# Patient Record
Sex: FEMALE | URBAN - METROPOLITAN AREA
[De-identification: names, ages, dates, MRNs, and addresses within clinical notes are randomized per-mention and may not be internally consistent; named-entity substitution may affect disease eponyms.]

---

## 2023-01-23 ENCOUNTER — INITIAL CONSULT (OUTPATIENT)
Dept: PAIN MANAGEMENT | Age: 68
End: 2023-01-23
Payer: MEDICAID

## 2023-01-23 VITALS
WEIGHT: 198 LBS | BODY MASS INDEX: 35.08 KG/M2 | HEIGHT: 63 IN | SYSTOLIC BLOOD PRESSURE: 138 MMHG | TEMPERATURE: 97.6 F | DIASTOLIC BLOOD PRESSURE: 88 MMHG

## 2023-01-23 DIAGNOSIS — M16.11 OSTEOARTHRITIS OF RIGHT HIP, UNSPECIFIED OSTEOARTHRITIS TYPE: ICD-10-CM

## 2023-01-23 DIAGNOSIS — M16.12 OSTEOARTHRITIS OF LEFT HIP, UNSPECIFIED OSTEOARTHRITIS TYPE: Primary | ICD-10-CM

## 2023-01-23 PROCEDURE — G8417 CALC BMI ABV UP PARAM F/U: HCPCS | Performed by: PAIN MEDICINE

## 2023-01-23 PROCEDURE — 1036F TOBACCO NON-USER: CPT | Performed by: PAIN MEDICINE

## 2023-01-23 PROCEDURE — G8484 FLU IMMUNIZE NO ADMIN: HCPCS | Performed by: PAIN MEDICINE

## 2023-01-23 PROCEDURE — 1123F ACP DISCUSS/DSCN MKR DOCD: CPT | Performed by: PAIN MEDICINE

## 2023-01-23 PROCEDURE — 3017F COLORECTAL CA SCREEN DOC REV: CPT | Performed by: PAIN MEDICINE

## 2023-01-23 PROCEDURE — G8400 PT W/DXA NO RESULTS DOC: HCPCS | Performed by: PAIN MEDICINE

## 2023-01-23 PROCEDURE — 1090F PRES/ABSN URINE INCON ASSESS: CPT | Performed by: PAIN MEDICINE

## 2023-01-23 PROCEDURE — G8427 DOCREV CUR MEDS BY ELIG CLIN: HCPCS | Performed by: PAIN MEDICINE

## 2023-01-23 PROCEDURE — 99203 OFFICE O/P NEW LOW 30 MIN: CPT | Performed by: PAIN MEDICINE

## 2023-01-23 RX ORDER — AMLODIPINE BESYLATE 10 MG/1
TABLET ORAL
COMMUNITY
Start: 2023-01-04

## 2023-01-23 RX ORDER — TRAMADOL HYDROCHLORIDE 50 MG/1
50 TABLET ORAL EVERY 8 HOURS PRN
Qty: 21 TABLET | Refills: 0 | Status: SHIPPED | OUTPATIENT
Start: 2023-01-23 | End: 2023-01-30

## 2023-01-23 RX ORDER — ATORVASTATIN CALCIUM 20 MG/1
TABLET, FILM COATED ORAL
COMMUNITY
Start: 2022-12-07

## 2023-01-23 ASSESSMENT — ENCOUNTER SYMPTOMS
ALLERGIC/IMMUNOLOGIC NEGATIVE: 1
EYES NEGATIVE: 1
GASTROINTESTINAL NEGATIVE: 1
RESPIRATORY NEGATIVE: 1

## 2023-01-23 NOTE — PROGRESS NOTES
History of Present Illness     Patient Identification  Gene Anderson is a 78 y.o. female. Patient information was obtained from patient. Chief Complaint   Chief Complaint   Patient presents with    Hip Pain     bilateral       Patient presents with complaint of anterior thigh pain past 2 years. This is a result of no known injury. Has been gradually worsening since. described as aching and sharp and rated as severe and 8 / 10, without radiation. Symptoms include no other symptoms. The patient denies weakness, numbness, incontinence. The patient denies other injuries. Care prior to arrival consisted of NSAID and acetaminophen with moderate relief. History reviewed. No pertinent past medical history. History reviewed. No pertinent family history. Current Outpatient Medications   Medication Sig Dispense Refill    amLODIPine (NORVASC) 10 MG tablet take 1 tablet by mouth once daily      atorvastatin (LIPITOR) 20 MG tablet take 1 tablet by mouth once daily       No current facility-administered medications for this visit. No Known Allergies    Review of Systems  Review of Systems   Constitutional: Negative. HENT: Negative. Eyes: Negative. Respiratory: Negative. Cardiovascular: Negative. Gastrointestinal: Negative. Endocrine: Negative. Genitourinary: Negative. Musculoskeletal: Negative. Skin: Negative. Allergic/Immunologic: Negative. Neurological: Negative. 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 Hematological: Negative. Psychiatric/Behavioral: Negative. All other systems reviewed and are negative. Physical Exam     /88 (Site: Left Upper Arm, Position: Sitting, Cuff Size: Large Adult)   Temp 97.6 °F (36.4 °C) (Temporal)   Ht 5' 3\" (1.6 m)   Wt 198 lb (89.8 kg)   BMI 35.07 kg/m²   Physical Exam  Vitals and nursing note reviewed. Constitutional:       Appearance: Normal appearance. HENT:      Head: Normocephalic.       Right Ear: Ear canal normal.      Left Ear: Ear canal normal.      Nose: Nose normal.      Mouth/Throat:      Mouth: Mucous membranes are moist.   Eyes:      Extraocular Movements: Extraocular movements intact. Conjunctiva/sclera: Conjunctivae normal.      Pupils: Pupils are equal, round, and reactive to light. Cardiovascular:      Rate and Rhythm: Normal rate and regular rhythm. Pulses: Normal pulses. Heart sounds: Normal heart sounds. Pulmonary:      Effort: Pulmonary effort is normal.      Breath sounds: Normal breath sounds. Abdominal:      General: Abdomen is flat. Bowel sounds are normal.      Palpations: Abdomen is soft. Musculoskeletal:         General: Normal range of motion. Cervical back: Normal range of motion and neck supple. Comments: Severe antalgic Gait, Able to walk on Toes and heels, No facet or SI Tenderness, Severe pain on Hip grinding. Skin:     General: Skin is warm. Capillary Refill: Capillary refill takes less than 2 seconds. Neurological:      General: No focal deficit present. Mental Status: She is alert. Mental status is at baseline. She is disoriented. Psychiatric:         Mood and Affect: Mood normal.         Behavior: Behavior normal.         Thought Content: Thought content normal.         Judgment: Judgment normal.         Plan     Studies: Xrays Done in  Severe left moderate Rt sided OA. Will start small dose Tramadol, Advised THAs both sides.

## 2023-01-30 ENCOUNTER — OFFICE VISIT (OUTPATIENT)
Dept: PAIN MANAGEMENT | Age: 68
End: 2023-01-30
Payer: MEDICAID

## 2023-01-30 VITALS
HEIGHT: 63 IN | BODY MASS INDEX: 35.08 KG/M2 | TEMPERATURE: 97.9 F | DIASTOLIC BLOOD PRESSURE: 78 MMHG | SYSTOLIC BLOOD PRESSURE: 124 MMHG | WEIGHT: 198 LBS

## 2023-01-30 DIAGNOSIS — M16.11 OSTEOARTHRITIS OF RIGHT HIP, UNSPECIFIED OSTEOARTHRITIS TYPE: ICD-10-CM

## 2023-01-30 DIAGNOSIS — M16.12 OSTEOARTHRITIS OF LEFT HIP, UNSPECIFIED OSTEOARTHRITIS TYPE: ICD-10-CM

## 2023-01-30 LAB
6-ACETYLMORPHINE, UR: NORMAL
AMPHETAMINE SCREEN, URINE: NORMAL
BARBITURATE SCREEN, URINE: NORMAL
BENZODIAZEPINE SCREEN, URINE: NORMAL
CANNABINOID SCREEN URINE: NORMAL
COCAINE METABOLITE, URINE: NORMAL
CREATININE URINE: NORMAL
EDDP, URINE: NORMAL
ETHANOL URINE: NORMAL
MDMA URINE: NORMAL
METHADONE SCREEN, URINE: NORMAL
METHAMPHETAMINE, URINE: NORMAL
OPIATES, URINE: NORMAL
OXYCODONE: NORMAL
PCP: NORMAL
PH, URINE: NORMAL
PHENCYCLIDINE, URINE: NORMAL
PROPOXYPHENE, URINE: NORMAL
TRICYCLIC ANTIDEPRESSANTS, UR: NORMAL

## 2023-01-30 PROCEDURE — 1090F PRES/ABSN URINE INCON ASSESS: CPT | Performed by: PAIN MEDICINE

## 2023-01-30 PROCEDURE — 3017F COLORECTAL CA SCREEN DOC REV: CPT | Performed by: PAIN MEDICINE

## 2023-01-30 PROCEDURE — 99213 OFFICE O/P EST LOW 20 MIN: CPT | Performed by: PAIN MEDICINE

## 2023-01-30 PROCEDURE — 1123F ACP DISCUSS/DSCN MKR DOCD: CPT | Performed by: PAIN MEDICINE

## 2023-01-30 PROCEDURE — G8417 CALC BMI ABV UP PARAM F/U: HCPCS | Performed by: PAIN MEDICINE

## 2023-01-30 PROCEDURE — 1036F TOBACCO NON-USER: CPT | Performed by: PAIN MEDICINE

## 2023-01-30 PROCEDURE — G8427 DOCREV CUR MEDS BY ELIG CLIN: HCPCS | Performed by: PAIN MEDICINE

## 2023-01-30 PROCEDURE — G8400 PT W/DXA NO RESULTS DOC: HCPCS | Performed by: PAIN MEDICINE

## 2023-01-30 PROCEDURE — G8484 FLU IMMUNIZE NO ADMIN: HCPCS | Performed by: PAIN MEDICINE

## 2023-01-30 RX ORDER — TRAMADOL HYDROCHLORIDE 50 MG/1
50 TABLET ORAL EVERY 8 HOURS PRN
Qty: 84 TABLET | Refills: 0 | Status: SHIPPED | OUTPATIENT
Start: 2023-01-30 | End: 2023-02-27

## 2023-01-30 ASSESSMENT — ENCOUNTER SYMPTOMS
RESPIRATORY NEGATIVE: 1
GASTROINTESTINAL NEGATIVE: 1
ALLERGIC/IMMUNOLOGIC NEGATIVE: 1
EYES NEGATIVE: 1

## 2023-01-30 NOTE — PROGRESS NOTES
History of Present Illness     Patient Identification  Dulce Muñiz is a 78 y.o. female. Patient information was obtained from patient. Chief Complaint   Chief Complaint   Patient presents with    Hip Pain     Bilateral hip pain       Patient presents with complaint of anterior thigh pain past 2 years. This is a result of no known injury. Has been gradually worsening since. described as aching and sharp and rated as severe and 8 / 10, without radiation. Symptoms include no other symptoms. The patient denies weakness, numbness, incontinence. The patient denies other injuries. Care prior to arrival consisted of NSAID and acetaminophen with moderate relief. Started Tramadol small dose good relief no side effects, no adverse events able to do her ADLs. History reviewed. No pertinent past medical history. History reviewed. No pertinent family history. Current Outpatient Medications   Medication Sig Dispense Refill    amLODIPine (NORVASC) 10 MG tablet take 1 tablet by mouth once daily      atorvastatin (LIPITOR) 20 MG tablet take 1 tablet by mouth once daily      traMADol (ULTRAM) 50 MG tablet Take 1 tablet by mouth every 8 hours as needed for Pain for up to 7 days. Max Daily Amount: 150 mg 21 tablet 0     No current facility-administered medications for this visit. No Known Allergies    Review of Systems  Review of Systems   Constitutional: Negative. HENT: Negative. Eyes: Negative. Respiratory: Negative. Cardiovascular: Negative. Gastrointestinal: Negative. Endocrine: Negative. Genitourinary: Negative. Musculoskeletal: Negative. Skin: Negative. Allergic/Immunologic: Negative. Neurological: Negative. 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 Hematological: Negative. Psychiatric/Behavioral: Negative. All other systems reviewed and are negative.      Physical Exam     /78   Temp 97.9 °F (36.6 °C)   Ht 5' 3\" (1.6 m)   Wt 198 lb (89.8 kg)   BMI 35.07 kg/m²   Physical Exam  Vitals and nursing note reviewed. Constitutional:       Appearance: Normal appearance. HENT:      Head: Normocephalic. Right Ear: Ear canal normal.      Left Ear: Ear canal normal.      Nose: Nose normal.      Mouth/Throat:      Mouth: Mucous membranes are moist.   Eyes:      Extraocular Movements: Extraocular movements intact. Conjunctiva/sclera: Conjunctivae normal.      Pupils: Pupils are equal, round, and reactive to light. Cardiovascular:      Rate and Rhythm: Normal rate and regular rhythm. Pulses: Normal pulses. Heart sounds: Normal heart sounds. Pulmonary:      Effort: Pulmonary effort is normal.      Breath sounds: Normal breath sounds. Abdominal:      General: Abdomen is flat. Bowel sounds are normal.      Palpations: Abdomen is soft. Musculoskeletal:         General: Normal range of motion. Cervical back: Normal range of motion and neck supple. Comments: Severe antalgic Gait, Able to walk on Toes and heels, No facet or SI Tenderness, Severe pain on Hip grinding. Skin:     General: Skin is warm. Capillary Refill: Capillary refill takes less than 2 seconds. Neurological:      General: No focal deficit present. Mental Status: She is alert and oriented to person, place, and time. Mental status is at baseline. Psychiatric:         Mood and Affect: Mood normal.         Behavior: Behavior normal.         Thought Content: Thought content normal.         Judgment: Judgment normal.         Plan     Studies: Xrays Done in  Severe left moderate Rt sided OA. Will continue small dose Tramadol, Advised THAs both sides.

## 2023-02-01 PROBLEM — I10 HYPERTENSION: Status: ACTIVE | Noted: 2023-02-01

## 2023-02-01 PROBLEM — E66.9 OBESITY (BMI 30.0-34.9): Status: ACTIVE | Noted: 2023-02-01

## 2023-02-01 PROBLEM — E78.00 ELEVATED SERUM CHOLESTEROL: Status: ACTIVE | Noted: 2023-02-01

## 2023-02-01 PROBLEM — M25.551 HIP PAIN, BILATERAL: Status: ACTIVE | Noted: 2023-02-01

## 2023-02-01 PROBLEM — M25.552 HIP PAIN, BILATERAL: Status: ACTIVE | Noted: 2023-02-01

## 2023-02-01 PROBLEM — E66.811 OBESITY (BMI 30.0-34.9): Status: ACTIVE | Noted: 2023-02-01

## 2023-02-01 PROBLEM — M25.50 ARTHRALGIA: Status: ACTIVE | Noted: 2023-02-01

## 2023-02-01 RX ORDER — LIDOCAINE 560 MG/1
PATCH PERCUTANEOUS; TOPICAL; TRANSDERMAL
COMMUNITY
Start: 2022-04-20

## 2023-02-01 RX ORDER — VIT C/E/ZN/COPPR/LUTEIN/ZEAXAN 250MG-90MG
CAPSULE ORAL
COMMUNITY
End: 2023-07-19 | Stop reason: DRUGHIGH

## 2023-02-01 RX ORDER — AMLODIPINE BESYLATE 10 MG/1
1 TABLET ORAL DAILY
COMMUNITY
Start: 2021-12-08 | End: 2023-07-19 | Stop reason: SDUPTHER

## 2023-02-01 RX ORDER — ATORVASTATIN CALCIUM 20 MG/1
1 TABLET, FILM COATED ORAL DAILY
COMMUNITY
Start: 2022-04-13

## 2023-02-13 ENCOUNTER — OFFICE VISIT (OUTPATIENT)
Dept: ORTHOPEDIC SURGERY | Age: 68
End: 2023-02-13
Payer: MEDICAID

## 2023-02-13 ENCOUNTER — HOSPITAL ENCOUNTER (OUTPATIENT)
Dept: ORTHOPEDIC SURGERY | Age: 68
Discharge: HOME | End: 2023-02-15
Payer: MEDICAID

## 2023-02-13 VITALS
OXYGEN SATURATION: 97 % | HEIGHT: 63 IN | BODY MASS INDEX: 34.2 KG/M2 | TEMPERATURE: 96.9 F | WEIGHT: 193 LBS | HEART RATE: 104 BPM

## 2023-02-13 DIAGNOSIS — M16.12 PRIMARY OSTEOARTHRITIS OF LEFT HIP: ICD-10-CM

## 2023-02-13 DIAGNOSIS — M16.12 PRIMARY OSTEOARTHRITIS OF LEFT HIP: Primary | ICD-10-CM

## 2023-02-13 DIAGNOSIS — M16.11 PRIMARY OSTEOARTHRITIS OF RIGHT HIP: ICD-10-CM

## 2023-02-13 PROCEDURE — 73502 X-RAY EXAM HIP UNI 2-3 VIEWS: CPT | Performed by: ORTHOPAEDIC SURGERY

## 2023-02-13 PROCEDURE — 99204 OFFICE O/P NEW MOD 45 MIN: CPT | Performed by: ORTHOPAEDIC SURGERY

## 2023-02-13 PROCEDURE — 73502 X-RAY EXAM HIP UNI 2-3 VIEWS: CPT

## 2023-02-13 PROCEDURE — 1123F ACP DISCUSS/DSCN MKR DOCD: CPT | Performed by: ORTHOPAEDIC SURGERY

## 2023-02-13 RX ORDER — CELECOXIB 200 MG/1
CAPSULE ORAL
Qty: 15 CAPSULE | Refills: 2 | Status: SHIPPED | OUTPATIENT
Start: 2023-02-13 | End: 2023-02-20

## 2023-02-13 RX ORDER — DIVALPROEX SODIUM 250 MG/1
TABLET, DELAYED RELEASE ORAL
COMMUNITY
Start: 2004-08-23

## 2023-02-13 RX ORDER — ESCITALOPRAM OXALATE 10 MG/1
TABLET ORAL
COMMUNITY
Start: 2004-08-23

## 2023-02-13 NOTE — PROGRESS NOTES
Subjective:      Patient ID: Davis Gabriel is a 78 y.o. female who presents today for:  Chief Complaint   Patient presents with    Hip Pain     Pt states pain in both hips. Pain started 3 years, pain started on left side of hip. A lot wrse in the last year. Walk w/ cane to accommodate. No injury. New patient, referred by PCP       HPI  The patient is a 52-year-old female comes in with left greater than right hip pain. She states that this has been severe over the last 2 years but has been ongoing for roughly 4 years. She has seen pain management for this. No past medical history on file. No past surgical history on file. Tobacco Use      Smoking status: Never      Smokeless tobacco: Never     has no history on file for drug use. No Known Allergies      Objective:   Pulse (!) 104   Temp 96.9 °F (36.1 °C) (Temporal)   Ht 5' 3\" (1.6 m)   Wt 193 lb (87.5 kg)   SpO2 97%   BMI 34.19 kg/m²     Physical Exam :  Pain with internal/external rotation of both hips at the groin. Limited range of motion of both hips. Radiographs and Laboratory Studies:     Diagnostic Imaging Studies:    XR HIP LEFT (2-3 VIEWS)  X-rays of her left hip were taken today. She has severe bilateral hip   osteoarthritic degenerative joint changes left worse than right. Assessment:       Diagnosis Orders   1. Primary osteoarthritis of left hip  XR HIP LEFT (2-3 VIEWS)      2. Primary osteoarthritis of right hip             Plan:      I am scheduling her for bilateral intra-articular hip injections with cortisone and lidocaine. She is a candidate for total hip arthroplasty when she is ready. I made sure to tell her that I will not do any surgery nor do I recommend her to have any type of hip surgery for at least 3 months from the time of her injection.        Surgery Phone: Vishal Samano   Surgery Fax: [phone]    Phone: [phone]          Fax: [phone]    Orthopedics: Surgery Scheduling, PAT & PRE-OP Order Form  Call to advance [place] at [phone] at least 24 hours prior to date of service     Surgery Location: [facility]: [address] [place] [address] Surgery Date: To be determined time: To be determined  Patient's Name: Edmar Lopez : 1955    Gender: female   Home Phone:  [phone] Cell Phone: [phone]    #:  [id number]  Emergency Contact:  Des   Phone: [phone]  Payor: Ariella Soto /  /  /    ID No.: [id number]      PROVIDER TO COMPLETE:  Diagnosis: Bilateral hip osteoarthritis  Procedure: Bilateral hip injections with cortisone and lidocaine  ICD-10 Code: _______________________  CPT Codes: ________________________  Neuromonitoring: []   Navigation: []   HCI table: [x]   HCI Table with With [employer]: []   Mayer Table []   C-arm Intraoperatively: [x]   Xray Flat Plate Intraoperatively: []   Positioning:  Supine [x]              Prone:  []   Lateral Decubitus LEFT side up []   Lateral Decubitus RIGHT side up []   Beach-Chair []   Alric Riding []   Cell Saver: []   Microscope: []   Implants: None  Surgery Scheduled as: Outpatient  Anesthesia Requested:   General []   MAC sedation [x]   Interscalene Block []   Referring Family Doctor: Nery Ott MD   PAT  [x] [facility] PAT Date/Time:                                                            [x] History & Physical [] Physician will Provide [] Attached [] Dictated [] Other  [x] Follow Anesthesia Pre-Op Orders for X-rays, [facility]     [x] SN & PT to evaluate and treat/educate disease management, medications, home safety & equipment needs for total joint patients  [] Other: ____________________________________________________  Consults: Medical/Cardiac Clearance done by  ____________________  PRE-OP ORDERS:   Allergies: Patient has no known allergies.  Latex Allergies:             Diabetic: [] IV ________________________  [x] IV Start with J-loop     Preprinted Orders: Attached [] Yes [] No   ANTIBIOTIC PRE-OP: [x] ANCEF 2 gram IVPB if > 120 kg 3 grams IVPB within 1 hour of incision.   [] TXA Protocol   [] Other:   [x] NPO   [] Knee high anti-embolic hose   [] Thigh high anti-embolic hose   Other: ______________________________________________________    Physician Signature Required: Marcia Scott DO Date/Time: 2/13/2023     Marcia Scott DO  Orthopedic and Spine Surgeon  [facility]  [phone]

## 2023-03-03 ENCOUNTER — PREP FOR PROCEDURE (OUTPATIENT)
Dept: ORTHOPEDIC SURGERY | Age: 68
End: 2023-03-03

## 2023-03-03 ENCOUNTER — OFFICE VISIT (OUTPATIENT)
Dept: ORTHOPEDIC SURGERY | Age: 68
End: 2023-03-03

## 2023-03-03 VITALS
HEIGHT: 63 IN | DIASTOLIC BLOOD PRESSURE: 92 MMHG | BODY MASS INDEX: 33.66 KG/M2 | OXYGEN SATURATION: 98 % | TEMPERATURE: 97.2 F | SYSTOLIC BLOOD PRESSURE: 146 MMHG | WEIGHT: 190 LBS | HEART RATE: 96 BPM

## 2023-03-03 DIAGNOSIS — Z01.818 PREOP EXAMINATION: Primary | ICD-10-CM

## 2023-03-03 DIAGNOSIS — Z01.818 PREOP EXAMINATION: ICD-10-CM

## 2023-03-03 LAB
ANION GAP SERPL CALCULATED.3IONS-SCNC: 13 MEQ/L (ref 9–15)
BUN BLDV-MCNC: 17 MG/DL (ref 8–23)
CALCIUM SERPL-MCNC: 10 MG/DL (ref 8.5–9.9)
CHLORIDE BLD-SCNC: 100 MEQ/L (ref 95–107)
CO2: 26 MEQ/L (ref 20–31)
CREAT SERPL-MCNC: 0.81 MG/DL (ref 0.5–0.9)
GFR SERPL CREATININE-BSD FRML MDRD: >60 ML/MIN/{1.73_M2}
GLUCOSE BLD-MCNC: 98 MG/DL (ref 70–99)
HCT VFR BLD CALC: 39.1 % (ref 37–47)
HEMOGLOBIN: 12.9 G/DL (ref 12–16)
MCH RBC QN AUTO: 33.3 PG (ref 27–31.3)
MCHC RBC AUTO-ENTMCNC: 33.1 % (ref 33–37)
MCV RBC AUTO: 100.5 FL (ref 79.4–94.8)
PDW BLD-RTO: 13.5 % (ref 11.5–14.5)
PLATELET # BLD: 211 K/UL (ref 130–400)
POTASSIUM SERPL-SCNC: 4.3 MEQ/L (ref 3.4–4.9)
RBC # BLD: 3.89 M/UL (ref 4.2–5.4)
SODIUM BLD-SCNC: 139 MEQ/L (ref 135–144)
WBC # BLD: 5.1 K/UL (ref 4.8–10.8)

## 2023-03-03 RX ORDER — SODIUM CHLORIDE 0.9 % (FLUSH) 0.9 %
5-40 SYRINGE (ML) INJECTION PRN
OUTPATIENT
Start: 2023-03-03

## 2023-03-03 RX ORDER — SODIUM CHLORIDE 0.9 % (FLUSH) 0.9 %
5-40 SYRINGE (ML) INJECTION EVERY 12 HOURS SCHEDULED
OUTPATIENT
Start: 2023-03-03

## 2023-03-03 RX ORDER — SODIUM CHLORIDE 9 MG/ML
INJECTION, SOLUTION INTRAVENOUS PRN
OUTPATIENT
Start: 2023-03-03

## 2023-03-03 NOTE — PROGRESS NOTES
[place] [address] and Sports Medicine    H&P: Preadmission Testing     Patient: Alvin Flores  YOB: 1955  MRN: [id number]    Subjective:     Chief Complaint   Patient presents with    Pre-op Exam     Pt states follow up for pre-op. Pre-op for bilateral hip injection w/ cortisone injection. HPI: Alvin Flores is a 78 y.o. femaleis here for bilateral intra-articular hip injections with cortisone lidocaine under MAC to be performed by Dr. Sp Senior. This is a preadmission consultation requested by the surgeon themself. They have a pertinent history of HTN    There is no known history of heart disease or infarction. There is no recent chest pain or discomfort, palpitations, shortness of breath, ZHENG, difficulties with exercise, bilateral ankle swelling. Not taking any blood thinners. There is no known history of obstructive or restrictive lung disease. No smoking. No recent wheezing or use of any kind of inhalers. No recent hospitalizations for any lung-related illnesses. No recent Covid infection. No known history of gastric issues which includes ulcers, herniations, bariatric surgeries. No recent GI bleeds    No known history of hyper or hypercoagulable states. They are not diabetic. They have normal kidney function. Past Medical History:    No past medical history on file. Past Surgical History:    No past surgical history on file.     Medications Prior to Admission:    Current Outpatient Medications   Medication Sig Dispense Refill    amLODIPine (NORVASC) 10 MG tablet take 1 tablet by mouth once daily      atorvastatin (LIPITOR) 20 MG tablet take 1 tablet by mouth once daily      divalproex (DEPAKOTE) 250 MG DR tablet Take by mouth (Patient not taking: Reported on 3/3/2023)      escitalopram (LEXAPRO) 10 MG tablet Take by mouth (Patient not taking: Reported on 3/3/2023)      celecoxib (CELEBREX) 200 MG capsule Patient to take 1 tablet by mouth daily with food 15 capsule 2     No current facility-administered medications for this visit. Allergies:    Patient has no known allergies. Social History:   Social History     Socioeconomic History    Marital status: Unknown     Spouse name: Not on file    Number of children: Not on file    Years of education: Not on file    Highest education level: Not on file   Occupational History    Not on file   Tobacco Use    Smoking status: Never    Smokeless tobacco: Never   Substance and Sexual Activity    Alcohol use: Yes     Alcohol/week: 2.0 standard drinks     Types: 2 Cans of beer per week     Comment: 2 drinks per week    Drug use: Not on file    Sexual activity: Not on file   Other Topics Concern    Not on file   Social History Narrative    Not on file     Social Determinants of Health     Financial Resource Strain: Not on file   Food Insecurity: Not on file   Transportation Needs: Not on file   Physical Activity: Not on file   Stress: Not on file   Social Connections: Not on file   Intimate Partner Violence: Not on file   Housing Stability: Not on file       Family History:   No family history on file. Objective:   BP (!) 146/92 (Site: Left Upper Arm, Position: Sitting, Cuff Size: Medium Adult)   Pulse 96   Temp 97.2 °F (36.2 °C) (Temporal)   Ht 5' 3\" (1.6 m)   Wt 190 lb (86.2 kg)   SpO2 98%   BMI 33.66 kg/m²     Physical Exam  Constitutional:       General: She is not in acute distress. Appearance: Normal appearance. She is not ill-appearing. HENT:      Head: Normocephalic. Nose: Nose normal. No congestion or rhinorrhea. Mouth/Throat:      Mouth: Mucous membranes are moist.      Pharynx: Oropharynx is clear. No oropharyngeal exudate or posterior oropharyngeal erythema. Eyes:      Extraocular Movements: Extraocular movements intact. Pupils: Pupils are equal, round, and reactive to light. Cardiovascular:      Rate and Rhythm: Normal rate and regular rhythm. Pulses: Normal pulses. Heart sounds: Normal heart sounds. Pulmonary:      Effort: Pulmonary effort is normal.      Breath sounds: Normal breath sounds. No wheezing, rhonchi or rales. Abdominal:      General: Abdomen is flat. Bowel sounds are normal.      Palpations: Abdomen is soft. Tenderness: There is no abdominal tenderness. Skin:     General: Skin is warm and dry. Capillary Refill: Capillary refill takes less than 2 seconds. Comments: No swelling or erythema over the incision site   Neurological:      General: No focal deficit present. Mental Status: She is alert and oriented to person, place, and time. Radiographs and Laboratory Studies:   EKG:  NSR  Laboratory Studies:   No results found for: WBC, HGB, HCT, MCV, PLT  No results found for: SEDRATE  No results found for: CRP    Assessment and Plan:      Diagnosis Orders   1. Preop examination  EKG 12 lead    EKG 12 lead          Procedure: Bilateral intra-articular hip injections under MAC  We discussed the risks, benefits and postoperative care following surgery in great detail. Blood work and EKG was ordered and will be reviewed. Medications to hold and/or continue before surgery were discussed.      Gabriela Jones PA-C  [facility]  [phone]

## 2023-03-03 NOTE — PATIENT INSTRUCTIONS
-please walk over to our lab for your blood work, located right outside our office near the elevators    -stop taking asprin and motrin until after your surgery. All blood thinners need to be stopped at least 5 days in advance prior to surgery. If you experiencing pain, the only medication you can take for that is tylenol 3-4x / day not to exceed 4000 mg.  -our surgery department will reach out the you the day before your surgery and let you know what time to arrive at the [address] [place] [address] Road (door [address])  -no eating / drinking the after midnight the night before surgery.  Sips of water the morning of for medications is OK  -if you have any questions, please call our office and I will be happy to answer them

## 2023-03-07 ENCOUNTER — TELEPHONE (OUTPATIENT)
Dept: ORTHOPEDIC SURGERY | Age: 68
End: 2023-03-07

## 2023-03-07 NOTE — TELEPHONE ENCOUNTER
Surgery Scheduling and Authorization Information    Surgery Date:3/16/2023  CPT Code(s) 86917  Procedure(s) Bilateral hip injections with cortisone and lidocaine      Approved [] Not Required [x] Denied []        How authorization obtained:  [X] web portal             [] via phone       [] Via fax    Provider  [] Evan Payne  [] Thong Buck  [] Lincroft June  [] Preeti Espinal  [] Vane Mckeon  [] Wanita Cheadle  [] Cali Mccray  [] Saqib Torres  [] Nevaeh Mcnulty  [] Abdi Garay  [x] Emanuel Rock        Surgery Sheet Faxed to Scheduling [x]  Surgery and Prior Authorization Information Sheet Scanned [x]

## 2023-03-09 LAB
ALANINE AMINOTRANSFERASE (SGPT) (U/L) IN SER/PLAS: 16 U/L (ref 7–45)
ALBUMIN (G/DL) IN SER/PLAS: 4.7 G/DL (ref 3.4–5)
ALKALINE PHOSPHATASE (U/L) IN SER/PLAS: 89 U/L (ref 33–136)
ANION GAP IN SER/PLAS: 13 MMOL/L (ref 10–20)
ASPARTATE AMINOTRANSFERASE (SGOT) (U/L) IN SER/PLAS: 16 U/L (ref 9–39)
BILIRUBIN TOTAL (MG/DL) IN SER/PLAS: 0.5 MG/DL (ref 0–1.2)
CALCIUM (MG/DL) IN SER/PLAS: 10.1 MG/DL (ref 8.6–10.3)
CARBON DIOXIDE, TOTAL (MMOL/L) IN SER/PLAS: 30 MMOL/L (ref 21–32)
CHLORIDE (MMOL/L) IN SER/PLAS: 103 MMOL/L (ref 98–107)
CHOLESTEROL (MG/DL) IN SER/PLAS: 216 MG/DL (ref 0–199)
CHOLESTEROL IN HDL (MG/DL) IN SER/PLAS: 65.2 MG/DL
CHOLESTEROL/HDL RATIO: 3.3
CREATININE (MG/DL) IN SER/PLAS: 0.94 MG/DL (ref 0.5–1.05)
GFR FEMALE: 67 ML/MIN/1.73M2
GLUCOSE (MG/DL) IN SER/PLAS: 99 MG/DL (ref 74–99)
LDL: 119 MG/DL (ref 0–99)
POTASSIUM (MMOL/L) IN SER/PLAS: 3.9 MMOL/L (ref 3.5–5.3)
PROTEIN TOTAL: 8.1 G/DL (ref 6.4–8.2)
SODIUM (MMOL/L) IN SER/PLAS: 142 MMOL/L (ref 136–145)
TRIGLYCERIDE (MG/DL) IN SER/PLAS: 161 MG/DL (ref 0–149)
UREA NITROGEN (MG/DL) IN SER/PLAS: 20 MG/DL (ref 6–23)
VLDL: 32 MG/DL (ref 0–40)

## 2023-03-15 ENCOUNTER — OFFICE VISIT (OUTPATIENT)
Dept: PRIMARY CARE | Facility: CLINIC | Age: 68
End: 2023-03-15
Payer: MEDICARE

## 2023-03-15 VITALS — BODY MASS INDEX: 32.01 KG/M2 | WEIGHT: 186.5 LBS | DIASTOLIC BLOOD PRESSURE: 80 MMHG | SYSTOLIC BLOOD PRESSURE: 124 MMHG

## 2023-03-15 DIAGNOSIS — Z00.00 ROUTINE GENERAL MEDICAL EXAMINATION AT HEALTH CARE FACILITY: Primary | ICD-10-CM

## 2023-03-15 DIAGNOSIS — E78.41 ELEVATED LIPOPROTEIN(A): ICD-10-CM

## 2023-03-15 PROCEDURE — 3079F DIAST BP 80-89 MM HG: CPT | Performed by: FAMILY MEDICINE

## 2023-03-15 PROCEDURE — G0439 PPPS, SUBSEQ VISIT: HCPCS | Performed by: FAMILY MEDICINE

## 2023-03-15 PROCEDURE — 1159F MED LIST DOCD IN RCRD: CPT | Performed by: FAMILY MEDICINE

## 2023-03-15 PROCEDURE — 3074F SYST BP LT 130 MM HG: CPT | Performed by: FAMILY MEDICINE

## 2023-03-15 PROCEDURE — 1160F RVW MEDS BY RX/DR IN RCRD: CPT | Performed by: FAMILY MEDICINE

## 2023-03-15 RX ORDER — CELECOXIB 200 MG/1
200 CAPSULE ORAL
COMMUNITY
Start: 2023-02-28 | End: 2023-07-19 | Stop reason: SDUPTHER

## 2023-03-15 ASSESSMENT — ENCOUNTER SYMPTOMS
PALPITATIONS: 0
WEAKNESS: 1
GASTROINTESTINAL NEGATIVE: 1
HYPERTENSION: 1
ENDOCRINE NEGATIVE: 1
MYALGIAS: 1
ARTHRALGIAS: 1
ALLERGIC/IMMUNOLOGIC NEGATIVE: 1
HEMATOLOGIC/LYMPHATIC NEGATIVE: 1
PSYCHIATRIC NEGATIVE: 1
NERVOUS/ANXIOUS: 0
BLURRED VISION: 0
AGITATION: 0
EYES NEGATIVE: 1
SLEEP DISTURBANCE: 0
NECK PAIN: 0
RESPIRATORY NEGATIVE: 1
CONFUSION: 0
CARDIOVASCULAR NEGATIVE: 1
ORTHOPNEA: 0
SHORTNESS OF BREATH: 0
PND: 0
ACTIVITY CHANGE: 1
SWEATS: 0

## 2023-03-15 NOTE — PROGRESS NOTES
Subjective   Reason for Visit: Sindy Bailey is an 67 y.o. female here for a Medicare Wellness visit.     Past Medical, Surgical, and Family History reviewed and updated in chart.    Reviewed all medications by prescribing practitioner or clinical pharmacist (such as prescriptions, OTCs, herbal therapies and supplements) and documented in the medical record.    ROV    Hypertension  This is a chronic problem. The current episode started more than 1 year ago. The problem has been resolved since onset. The problem is controlled. Pertinent negatives include no anxiety, blurred vision, chest pain, malaise/fatigue, neck pain, orthopnea, palpitations, peripheral edema, PND, shortness of breath or sweats. There are no associated agents to hypertension. Risk factors for coronary artery disease include dyslipidemia and sedentary lifestyle. The current treatment provides moderate improvement. There are no compliance problems.  There is no history of angina, kidney disease, CAD/MI, CVA, heart failure, left ventricular hypertrophy, PVD or retinopathy. Identifiable causes of hypertension include a hypertension causing med. There is no history of chronic renal disease, coarctation of the aorta, hyperaldosteronism, hypercortisolism, hyperparathyroidism, pheochromocytoma, renovascular disease, sleep apnea or a thyroid problem.                           Patient Care Team:  Adry Ortiz MD as PCP - General     Review of Systems   Constitutional:  Positive for activity change. Negative for malaise/fatigue.   HENT: Negative.     Eyes: Negative.  Negative for blurred vision.   Respiratory: Negative.  Negative for shortness of breath.    Cardiovascular: Negative.  Negative for chest pain, palpitations, orthopnea and PND.   Gastrointestinal: Negative.    Endocrine: Negative.    Genitourinary: Negative.    Musculoskeletal:  Positive for arthralgias, gait problem and myalgias. Negative for neck pain.   Allergic/Immunologic:  Negative.    Neurological:  Positive for weakness.   Hematological: Negative.    Psychiatric/Behavioral: Negative.  Negative for agitation, confusion, self-injury and sleep disturbance. The patient is not nervous/anxious.        Objective   Vitals:  /80 (BP Location: Right arm, Patient Position: Sitting)   Wt 84.6 kg (186 lb 8 oz)   BMI 32.01 kg/m²       Physical Exam  Constitutional:       Appearance: Normal appearance.   HENT:      Head: Normocephalic and atraumatic.      Nose: Nose normal.   Eyes:      Pupils: Pupils are equal, round, and reactive to light.   Cardiovascular:      Rate and Rhythm: Normal rate and regular rhythm.   Pulmonary:      Effort: Pulmonary effort is normal.   Musculoskeletal:         General: Tenderness: nelia hips.      Cervical back: Normal range of motion.   Skin:     General: Skin is warm.   Neurological:      General: No focal deficit present.      Mental Status: She is alert and oriented to person, place, and time.   Psychiatric:         Mood and Affect: Mood normal.         Behavior: Behavior normal.         Assessment/Plan   Problem List Items Addressed This Visit    None  Visit Diagnoses       Routine general medical examination at health care facility    -  Primary

## 2023-03-16 ENCOUNTER — HOSPITAL ENCOUNTER (OUTPATIENT)
Age: 68
Setting detail: OUTPATIENT SURGERY
Discharge: HOME | End: 2023-03-16
Attending: ORTHOPAEDIC SURGERY | Admitting: ORTHOPAEDIC SURGERY
Payer: MEDICARE

## 2023-03-16 ENCOUNTER — ANESTHESIA EVENT (OUTPATIENT)
Dept: OPERATING ROOM | Age: 68
End: 2023-03-16
Payer: MEDICARE

## 2023-03-16 ENCOUNTER — ANESTHESIA (OUTPATIENT)
Dept: OPERATING ROOM | Age: 68
End: 2023-03-16
Payer: MEDICARE

## 2023-03-16 ENCOUNTER — HOSPITAL ENCOUNTER (OUTPATIENT)
Dept: GENERAL RADIOLOGY | Age: 68
Setting detail: OUTPATIENT SURGERY
Discharge: HOME | End: 2023-03-18
Attending: ORTHOPAEDIC SURGERY
Payer: MEDICARE

## 2023-03-16 VITALS
RESPIRATION RATE: 16 BRPM | HEART RATE: 81 BPM | BODY MASS INDEX: 33.49 KG/M2 | HEIGHT: 63 IN | DIASTOLIC BLOOD PRESSURE: 99 MMHG | OXYGEN SATURATION: 100 % | WEIGHT: 189 LBS | TEMPERATURE: 97.3 F | SYSTOLIC BLOOD PRESSURE: 163 MMHG

## 2023-03-16 DIAGNOSIS — R52 PAIN: ICD-10-CM

## 2023-03-16 PROCEDURE — 3600000014 HC SURGERY LEVEL 4 ADDTL 15MIN: Performed by: ORTHOPAEDIC SURGERY

## 2023-03-16 PROCEDURE — 2709999900 HC NON-CHARGEABLE SUPPLY: Performed by: ORTHOPAEDIC SURGERY

## 2023-03-16 PROCEDURE — 7100000010 HC PHASE II RECOVERY - FIRST 15 MIN: Performed by: ORTHOPAEDIC SURGERY

## 2023-03-16 PROCEDURE — 3209999900 FLUORO FOR SURGICAL PROCEDURES

## 2023-03-16 PROCEDURE — 2580000003 HC RX 258: Performed by: STUDENT IN AN ORGANIZED HEALTH CARE EDUCATION/TRAINING PROGRAM

## 2023-03-16 PROCEDURE — 2500000003 HC RX 250 WO HCPCS: Performed by: ORTHOPAEDIC SURGERY

## 2023-03-16 PROCEDURE — 3700000000 HC ANESTHESIA ATTENDED CARE: Performed by: ORTHOPAEDIC SURGERY

## 2023-03-16 PROCEDURE — 6360000002 HC RX W HCPCS: Performed by: PHYSICIAN ASSISTANT

## 2023-03-16 PROCEDURE — 6360000002 HC RX W HCPCS: Performed by: ORTHOPAEDIC SURGERY

## 2023-03-16 PROCEDURE — 3600000004 HC SURGERY LEVEL 4 BASE: Performed by: ORTHOPAEDIC SURGERY

## 2023-03-16 PROCEDURE — 6360000002 HC RX W HCPCS: Performed by: NURSE ANESTHETIST, CERTIFIED REGISTERED

## 2023-03-16 PROCEDURE — 77002 NEEDLE LOCALIZATION BY XRAY: CPT | Performed by: ORTHOPAEDIC SURGERY

## 2023-03-16 PROCEDURE — 7100000011 HC PHASE II RECOVERY - ADDTL 15 MIN: Performed by: ORTHOPAEDIC SURGERY

## 2023-03-16 PROCEDURE — 3700000001 HC ADD 15 MINUTES (ANESTHESIA): Performed by: ORTHOPAEDIC SURGERY

## 2023-03-16 PROCEDURE — 20610 DRAIN/INJ JOINT/BURSA W/O US: CPT | Performed by: ORTHOPAEDIC SURGERY

## 2023-03-16 RX ORDER — OXYCODONE HYDROCHLORIDE 5 MG/1
5 TABLET ORAL PRN
Status: CANCELLED | OUTPATIENT
Start: 2023-03-16 | End: 2023-03-16

## 2023-03-16 RX ORDER — OXYCODONE HYDROCHLORIDE 5 MG/1
10 TABLET ORAL PRN
Status: CANCELLED | OUTPATIENT
Start: 2023-03-16 | End: 2023-03-16

## 2023-03-16 RX ORDER — MEPERIDINE HYDROCHLORIDE 25 MG/ML
12.5 INJECTION INTRAMUSCULAR; INTRAVENOUS; SUBCUTANEOUS EVERY 5 MIN PRN
Status: CANCELLED | OUTPATIENT
Start: 2023-03-16

## 2023-03-16 RX ORDER — ONDANSETRON 2 MG/ML
4 INJECTION INTRAMUSCULAR; INTRAVENOUS
Status: CANCELLED | OUTPATIENT
Start: 2023-03-16 | End: 2023-03-17

## 2023-03-16 RX ORDER — CEFAZOLIN SODIUM IN 0.9 % NACL 2 G/100 ML
2000 PLASTIC BAG, INJECTION (ML) INTRAVENOUS
Status: COMPLETED | OUTPATIENT
Start: 2023-03-16 | End: 2023-03-16

## 2023-03-16 RX ORDER — FENTANYL CITRATE 0.05 MG/ML
25 INJECTION, SOLUTION INTRAMUSCULAR; INTRAVENOUS EVERY 5 MIN PRN
Status: CANCELLED | OUTPATIENT
Start: 2023-03-16

## 2023-03-16 RX ORDER — LABETALOL HYDROCHLORIDE 5 MG/ML
10 INJECTION, SOLUTION INTRAVENOUS
Status: CANCELLED | OUTPATIENT
Start: 2023-03-16

## 2023-03-16 RX ORDER — HYDRALAZINE HYDROCHLORIDE 20 MG/ML
10 INJECTION INTRAMUSCULAR; INTRAVENOUS
Status: CANCELLED | OUTPATIENT
Start: 2023-03-16

## 2023-03-16 RX ORDER — PROCHLORPERAZINE EDISYLATE 5 MG/ML
5 INJECTION INTRAMUSCULAR; INTRAVENOUS
Status: CANCELLED | OUTPATIENT
Start: 2023-03-16 | End: 2023-03-17

## 2023-03-16 RX ORDER — SODIUM CHLORIDE 0.9 % (FLUSH) 0.9 %
5-40 SYRINGE (ML) INJECTION EVERY 12 HOURS SCHEDULED
Status: CANCELLED | OUTPATIENT
Start: 2023-03-16

## 2023-03-16 RX ORDER — PROPOFOL 10 MG/ML
INJECTION, EMULSION INTRAVENOUS PRN
Status: DISCONTINUED | OUTPATIENT
Start: 2023-03-16 | End: 2023-03-16 | Stop reason: SDUPTHER

## 2023-03-16 RX ORDER — SODIUM CHLORIDE, SODIUM LACTATE, POTASSIUM CHLORIDE, CALCIUM CHLORIDE 600; 310; 30; 20 MG/100ML; MG/100ML; MG/100ML; MG/100ML
INJECTION, SOLUTION INTRAVENOUS CONTINUOUS
Status: DISCONTINUED | OUTPATIENT
Start: 2023-03-16 | End: 2023-03-16 | Stop reason: HOSPADM

## 2023-03-16 RX ORDER — TRIAMCINOLONE ACETONIDE 40 MG/ML
INJECTION, SUSPENSION INTRA-ARTICULAR; INTRAMUSCULAR PRN
Status: DISCONTINUED | OUTPATIENT
Start: 2023-03-16 | End: 2023-03-16 | Stop reason: ALTCHOICE

## 2023-03-16 RX ORDER — DIPHENHYDRAMINE HYDROCHLORIDE 50 MG/ML
12.5 INJECTION INTRAMUSCULAR; INTRAVENOUS
Status: CANCELLED | OUTPATIENT
Start: 2023-03-16 | End: 2023-03-17

## 2023-03-16 RX ORDER — SODIUM CHLORIDE 9 MG/ML
INJECTION, SOLUTION INTRAVENOUS PRN
Status: DISCONTINUED | OUTPATIENT
Start: 2023-03-16 | End: 2023-03-16 | Stop reason: HOSPADM

## 2023-03-16 RX ORDER — SODIUM CHLORIDE 9 MG/ML
25 INJECTION, SOLUTION INTRAVENOUS PRN
Status: CANCELLED | OUTPATIENT
Start: 2023-03-16

## 2023-03-16 RX ORDER — LIDOCAINE HYDROCHLORIDE 10 MG/ML
INJECTION, SOLUTION EPIDURAL; INFILTRATION; INTRACAUDAL; PERINEURAL PRN
Status: DISCONTINUED | OUTPATIENT
Start: 2023-03-16 | End: 2023-03-16 | Stop reason: ALTCHOICE

## 2023-03-16 RX ORDER — SODIUM CHLORIDE 0.9 % (FLUSH) 0.9 %
5-40 SYRINGE (ML) INJECTION EVERY 12 HOURS SCHEDULED
Status: DISCONTINUED | OUTPATIENT
Start: 2023-03-16 | End: 2023-03-16 | Stop reason: HOSPADM

## 2023-03-16 RX ORDER — SODIUM CHLORIDE 0.9 % (FLUSH) 0.9 %
5-40 SYRINGE (ML) INJECTION PRN
Status: DISCONTINUED | OUTPATIENT
Start: 2023-03-16 | End: 2023-03-16 | Stop reason: HOSPADM

## 2023-03-16 RX ORDER — LIDOCAINE HYDROCHLORIDE 10 MG/ML
2 INJECTION, SOLUTION EPIDURAL; INFILTRATION; INTRACAUDAL; PERINEURAL ONCE
Status: DISCONTINUED | OUTPATIENT
Start: 2023-03-16 | End: 2023-03-16 | Stop reason: HOSPADM

## 2023-03-16 RX ORDER — SODIUM CHLORIDE, SODIUM LACTATE, POTASSIUM CHLORIDE, CALCIUM CHLORIDE 600; 310; 30; 20 MG/100ML; MG/100ML; MG/100ML; MG/100ML
INJECTION, SOLUTION INTRAVENOUS CONTINUOUS
Status: CANCELLED | OUTPATIENT
Start: 2023-03-16

## 2023-03-16 RX ORDER — FENTANYL CITRATE 0.05 MG/ML
50 INJECTION, SOLUTION INTRAMUSCULAR; INTRAVENOUS EVERY 5 MIN PRN
Status: CANCELLED | OUTPATIENT
Start: 2023-03-16

## 2023-03-16 RX ORDER — SODIUM CHLORIDE 0.9 % (FLUSH) 0.9 %
5-40 SYRINGE (ML) INJECTION PRN
Status: CANCELLED | OUTPATIENT
Start: 2023-03-16

## 2023-03-16 RX ADMIN — PROPOFOL 220 MG: 10 INJECTION, EMULSION INTRAVENOUS at 08:31

## 2023-03-16 RX ADMIN — SODIUM CHLORIDE, POTASSIUM CHLORIDE, SODIUM LACTATE AND CALCIUM CHLORIDE: 600; 310; 30; 20 INJECTION, SOLUTION INTRAVENOUS at 07:11

## 2023-03-16 RX ADMIN — Medication 2000 MG: at 08:33

## 2023-03-16 ASSESSMENT — PAIN SCALES - GENERAL
PAINLEVEL_OUTOF10: 0
PAINLEVEL_OUTOF10: 0

## 2023-03-16 ASSESSMENT — PAIN - FUNCTIONAL ASSESSMENT: PAIN_FUNCTIONAL_ASSESSMENT: 0-10

## 2023-03-16 NOTE — ANESTHESIA PRE PROCEDURE
Department of Anesthesiology  Preprocedure Note       Name:  Tamara Alvarez   Age:  78 y.o.  :  1955                                          MRN:  [id number]         Date:  3/16/2023      Surgeon: Tamara Kathleen):  Eugenio Gonsales DO    Procedure: Procedure(s):  Bilateral hip injections with cortisone and lidocaine,Abel table,C-arm Intraoperatively,Positioning:   Supine,MAC sedation  (PAT JAIME)    Medications prior to admission:   Prior to Admission medications    Medication Sig Start Date End Date Taking?  Authorizing Provider   divalproex (DEPAKOTE) 250 MG DR tablet Take by mouth  Patient not taking: No sig reported 04   Historical Provider, MD   escitalopram (LEXAPRO) 10 MG tablet Take by mouth  Patient not taking: No sig reported 04   Historical Provider, MD   celecoxib (CELEBREX) 200 MG capsule Patient to take 1 tablet by mouth daily with food 2/13/23 3/16/23  Eugenio Gonsales DO   amLODIPine (NORVASC) 10 MG tablet take 1 tablet by mouth once daily 23   Historical Provider, MD   atorvastatin (LIPITOR) 20 MG tablet take 1 tablet by mouth once daily 22   Historical Provider, MD       Current medications:    Current Facility-Administered Medications   Medication Dose Route Frequency Provider Last Rate Last Admin    lidocaine PF 1 % injection 2 mL  2 mL IntraDERmal Once Mallory Heaton MD        lactated ringers IV soln infusion   IntraVENous Continuous Mallory Heaton  mL/hr at 23 0711 New Bag at 23 0711    sodium chloride flush 0.9 % injection 5-40 mL  5-40 mL IntraVENous 2 times per day Chichi Davey PA-C        sodium chloride flush 0.9 % injection 5-40 mL  5-40 mL IntraVENous PRN ChichiDENI Dickson-CESAR        0.9 % sodium chloride infusion   IntraVENous PRN Chichijoanie Davey PA-C        ceFAZolin (ANCEF) 2000 mg in 0.9% sodium chloride 100 mL IVPB  2,000 mg IntraVENous On Call to [address] [place], [place] [address]AILYN           Allergies:  No Known Allergies    Problem List: Patient Active Problem List   Diagnosis Code    Primary osteoarthritis of left hip M16.12    Primary osteoarthritis of right hip M16.11       Past Medical History:  No past medical history on file. Past Surgical History:  No past surgical history on file. Social History:    Social History     Tobacco Use    Smoking status: Never    Smokeless tobacco: Never   Substance Use Topics    Alcohol use: Yes     Alcohol/week: 2.0 standard drinks     Types: 2 Cans of beer per week     Comment: 2 drinks per week                                Counseling given: Not Answered      Vital Signs (Current):   Vitals:    03/16/23 0647   BP: (!) 140/88   Pulse: 89   Resp: 18   Temp: 97.3 °F (36.3 °C)   TempSrc: Temporal   SpO2: 97%   Weight: 189 lb (85.7 kg)   Height: 5' 3\" (1.6 m)                                              BP Readings from Last 3 Encounters:   03/16/23 (!) 140/88   03/03/23 (!) 146/92   01/30/23 124/78       NPO Status: Time of last liquid consumption: 2300                        Time of last solid consumption: 1900                        Date of last liquid consumption: 03/15/23                        Date of last solid food consumption: 03/16/23    BMI:   Wt Readings from Last 3 Encounters:   03/16/23 189 lb (85.7 kg)   03/03/23 190 lb (86.2 kg)   02/13/23 193 lb (87.5 kg)     Body mass index is 33.48 kg/m².     CBC:   Lab Results   Component Value Date/Time    WBC 5.1 03/03/2023 12:38 PM    RBC 3.89 03/03/2023 12:38 PM    HGB 12.9 03/03/2023 12:38 PM    HCT 39.1 03/03/2023 12:38 PM    .5 03/03/2023 12:38 PM    RDW 13.5 03/03/2023 12:38 PM     03/03/2023 12:38 PM       CMP:   Lab Results   Component Value Date/Time     03/03/2023 12:38 PM    K 4.3 03/03/2023 12:38 PM     03/03/2023 12:38 PM    CO2 26 03/03/2023 12:38 PM    BUN 17 03/03/2023 12:38 PM    CREATININE 0.81 03/03/2023 12:38 PM    LABGLOM >60.0 03/03/2023 12:38 PM    GLUCOSE 98 03/03/2023 12:38 PM    CALCIUM 10.0 03/03/2023 12:38 PM       POC Tests: No results for input(s): POCGLU, POCNA, POCK, POCCL, POCBUN, POCHEMO, POCHCT in the last 72 hours. Coags: No results found for: PROTIME, INR, APTT    HCG (If Applicable): No results found for: PREGTESTUR, PREGSERUM, HCG, HCGQUANT     ABGs: No results found for: PHART, PO2ART, PKF9VMC, EPV6YTR, BEART, X4DNPFPS     Type & Screen (If Applicable):  No results found for: LABABO, LABRH    Drug/Infectious Status (If Applicable):  No results found for: HIV, HEPCAB    COVID-19 Screening (If Applicable): No results found for: COVID19        Anesthesia Evaluation  Patient summary reviewed and Nursing notes reviewed no history of anesthetic complications:   Airway: Mallampati: II  TM distance: >3 FB   Neck ROM: full  Mouth opening: > = 3 FB   Dental: normal exam         Pulmonary:Negative Pulmonary ROS and normal exam                               Cardiovascular:Negative CV ROS  Exercise tolerance: good (>4 METS),   (+) hypertension:, hyperlipidemia      ECG reviewed               Beta Blocker:  Not on Beta Blocker         Neuro/Psych:   Negative Neuro/Psych ROS  (+) seizures:,             GI/Hepatic/Renal: Neg GI/Hepatic/Renal ROS            Endo/Other: Negative Endo/Other ROS             Pt had PAT visit. Abdominal:             Vascular: negative vascular ROS. Other Findings:           Anesthesia Plan      MAC     ASA 2       Induction: intravenous. MIPS: Postoperative opioids intended and Prophylactic antiemetics administered. Anesthetic plan and risks discussed with patient. Plan discussed with CRNA.     Attending anesthesiologist reviewed and agrees with Pre Eval content                Aleksander Howell MD   3/16/2023

## 2023-03-16 NOTE — OP NOTE
Hip Injection    Patient Name: Shade Estrada  : 1955  MRN: [id number]  Patient Location: [facility] OR Pool/NONE   Account: [de-identified]     Date of Surgery: 3/16/2023   Surgeon(s):  Gilson Mays DO    Pre-op Diagnosis: Bilateral hip osteoarthritis    Post-Op Diagnosis: Same    Surgical Procedure(s): Bilateral hip injections with 4 cc of 1% lidocaine and 80 mg of Kenalog per side. Assistants:   * No surgical staff found *    Anesthesia type/spinal/blocks/exparel: Monitor Anesthesia Care      Estimated blood loss: None    Specimens:   * No specimens in log *    Drains:   * No LDAs found *    Implants (include size of box retrograde nail down road): * No implants in log *    Complications: None    Disposition: Returned the PACU in good condition. HPI/indication for surgery: The patient is a 59-year-old female who presents with bilateral hip pain due to bilateral osteoarthritic degenerative joint changes of each hip. X-rays were taken showing severe bilateral hip osteoarthritis. She has been suffering with this condition for quite some time. This is affecting her activities of daily living. She has failed all other nonoperative measures to date. Risks and potential complication of bilateral hip injections were discussed with the patient. She understood those risks and potential complications and agreed to proceed. A surgical consent was obtained and placed on the patient's chart. DESCRIPTION OF PROCEDURE: The patient was met in the preoperative holding area where the surgeon's initials were placed on both operative sites. The patient was taken to the operating room where they were positioned supine on the operating table. Monitored anesthesia care was instituted. A surgical time-out was performed to identify the patient by name, medical record number, and correct surgical site. The left and right hip was prepped and draped in the usual sterile fashion.   A straight anterior approach using a 22 gauge spinal needle was introduced onto the anterior aspect of both femoral necks and confirmed by air arthrogram using fluoroscopic imaging. A mixture of 4cc of 1% lidocaine and 80mg of Kenalog was then injected into each hip joint. As the needle was withdrawn, additional local anesthetic was instilled into the needle track. The patient tolerated the procedure, there were no complications, and the patient was returned to the PACU in stable condition.     Electronically signed by Gilson Mays DO on 3/16/23 at 8:45 AM LILIA

## 2023-03-16 NOTE — DISCHARGE INSTRUCTIONS
Theresa Dodson, DO  Orthopedics and Sports Medicine  [address] [facility] [address]  [phone]        DR. BARAJAS POST OP INJECTION INSTRUCTIONS    We will call you in 1 week to discuss your response to the injection(s). Anesthesia Precautions: You may feel drowsy, lightheaded, weak and/or unsteady. Pain medication can add to this effect  For 24 hours you should:  Have a responsible adult remain with you  Do not operate a vehicle/motorcycle, power tools, or anything that requires concentration  Do not make important decisions or sign legal documents  Do not drink alcohol (which includes beer and wine)  Drink liquids and eat a light meal on the day of surgery. Start your normal diet tomorrow    Medications  If needed, start over-the-counter or prescribed pain medication when the block starts wearing off  If prescribed a pain medication, it may cause constipation, so drink plenty of fluids, and use a stool softener or laxative (Miralax, Colace, Senokot-S, Dulcolax, etc.)  Resume your own medications  Resume blood thinners and or nonsteroidal anti-inflammatories (NSAIDs)24 hours after you get home    Activities  Resume normal activities as tolerated. Start slowly and gradually increase activity    General Instructions  Keep bandage clean and dry for 24 hours, the remove  You may shower after 24 hours. Do not soak in a bath tub, hot tub, or swim for 2 days  You may notice soreness or pain in the area of the shot for the first 48 hours. Relief may take up to a week to fully take effect  Ice packs or heating pad 20 minutes per hour to affected site, if working for pain relief. ALWAYS protect your skin from the cold or heat (skin check every 2 hours).    Signs of skin irritation: redness, swelling, welts, burns, itching, and/or change in skin appearance    Side Effects of Steroids  Difficulty sleeping, increased sweating, headaches, facial flushing, swelling of your arms and/or legs, increased appetite  You may experience numbness or weakness in your legs or feet if lumbar block was performed. Do not drive if this happens. Use caution with activities of daily living  Your blood sugar may go up if you have diabetes.   Closely monitor your diet and blood sugar  Your blood sugar should return to your normal level within a few days               Call your Doctor IF you experience any of the following:  Continuous heavy bleeding  LARGE amounts of redness/swelling at the injection site  Difficulty looking at bright lights  Neck stiffness  Fever above 101°F  Loss of bowel or bladder control  Increased pain  Severe headache  Nausea or vomiting lasting longer than 24 hours  New or increasing weakness or numbness of your arms or legs            *Have someone drive you to the nearest emergency room if you think you need to see a doctor right away

## 2023-03-16 NOTE — ANESTHESIA POSTPROCEDURE EVALUATION
Department of Anesthesiology  Postprocedure Note    Patient: Shade Estrada  MRN: [id number]  YOB: 1955  Date of evaluation: 3/16/2023      Procedure Summary     Date: 03/16/23 Room / Location: [facility] OR [address] [place] [address]    Anesthesia Start: 0825 Anesthesia Stop: 2050    Procedure: Bilateral hip injections with cortisone and lidocaine,Abel table,C-arm Intraoperatively,Positioning:   Supine,MAC sedation  (PAT JAIME) (Bilateral) Diagnosis:       Primary osteoarthritis of left hip      Osteoarthritis of right hip      (Primary osteoarthritis of left hip [M16.12])      (Osteoarthritis of right hip [M16.11])    Surgeons: Gilson Mays DO Responsible Provider: Yuki Chacon MD    Anesthesia Type: MAC ASA Status: 2          Anesthesia Type: No value filed.     Paula Phase I: Paula Score: 10    Paula Phase II: Paula Score: 10      Anesthesia Post Evaluation    Patient location during evaluation: bedside  Patient participation: complete - patient participated  Level of consciousness: awake and awake and alert  Pain score: 0  Airway patency: patent  Nausea & Vomiting: no nausea and no vomiting  Complications: no  Cardiovascular status: blood pressure returned to baseline and hemodynamically stable  Respiratory status: acceptable  Hydration status: euvolemic

## 2023-07-18 NOTE — PROGRESS NOTES
Discharge Planner Post-Acute Rehab PT:     Discharge Plan: Sister's with home modifications and HH PT    Precautions: Falls, NWB LLE, WB through heel only RLE    Edema: GCB RLE on until wound/dressing cares, then off 1hr    Current Status:  Bed Mobility: modA rolling & supine<>sit  Transfer: downhill slide board w/ therapy only modAx1. Liko Ax2 w/ nsg.   Gait: Not safe  Wheelchair: MOD-MAX A in manual w/c  Stairs: Not safe  Balance: Able to sit independently, unable to stand    Assessment: Emotional and fearful during slide board transfer today. A x 2 for safety. Not yet ready to perform multiple in one day.     Other Barriers to Discharge (DME, Family Training, etc):   Discharge location TBD - Sister's home likely not fully w/c accessible, would require ramp  Slideboard  Wheelchair  Family training  
Discharge instructions reviewed with patient.   Pt verbalized understanding of instructions with no questions
Quality 226: Preventive Care And Screening: Tobacco Use: Screening And Cessation Intervention: Patient screened for tobacco and never smoked
Detail Level: Detailed
Quality 130: Documentation Of Current Medications In The Medical Record: Current Medications Documented

## 2023-07-19 ENCOUNTER — OFFICE VISIT (OUTPATIENT)
Dept: PRIMARY CARE | Facility: CLINIC | Age: 68
End: 2023-07-19
Payer: MEDICARE

## 2023-07-19 ENCOUNTER — APPOINTMENT (OUTPATIENT)
Dept: PRIMARY CARE | Facility: CLINIC | Age: 68
End: 2023-07-19
Payer: MEDICARE

## 2023-07-19 VITALS
HEIGHT: 64 IN | OXYGEN SATURATION: 96 % | HEART RATE: 86 BPM | BODY MASS INDEX: 30.39 KG/M2 | SYSTOLIC BLOOD PRESSURE: 151 MMHG | DIASTOLIC BLOOD PRESSURE: 90 MMHG | WEIGHT: 178 LBS

## 2023-07-19 DIAGNOSIS — M25.552 HIP PAIN, BILATERAL: ICD-10-CM

## 2023-07-19 DIAGNOSIS — M25.551 HIP PAIN, BILATERAL: ICD-10-CM

## 2023-07-19 DIAGNOSIS — Z13.9 SCREENING FOR CONDITION: ICD-10-CM

## 2023-07-19 DIAGNOSIS — Z00.00 PREVENTATIVE HEALTH CARE: ICD-10-CM

## 2023-07-19 DIAGNOSIS — Z79.899 DRUG THERAPY: ICD-10-CM

## 2023-07-19 DIAGNOSIS — I10 HYPERTENSION, UNSPECIFIED TYPE: Primary | ICD-10-CM

## 2023-07-19 DIAGNOSIS — E66.9 OBESITY (BMI 30.0-34.9): ICD-10-CM

## 2023-07-19 DIAGNOSIS — E55.9 VITAMIN D DEFICIENCY: ICD-10-CM

## 2023-07-19 PROCEDURE — 3080F DIAST BP >= 90 MM HG: CPT | Performed by: FAMILY MEDICINE

## 2023-07-19 PROCEDURE — 99214 OFFICE O/P EST MOD 30 MIN: CPT | Performed by: FAMILY MEDICINE

## 2023-07-19 PROCEDURE — 1160F RVW MEDS BY RX/DR IN RCRD: CPT | Performed by: FAMILY MEDICINE

## 2023-07-19 PROCEDURE — 1159F MED LIST DOCD IN RCRD: CPT | Performed by: FAMILY MEDICINE

## 2023-07-19 PROCEDURE — 3077F SYST BP >= 140 MM HG: CPT | Performed by: FAMILY MEDICINE

## 2023-07-19 RX ORDER — CELECOXIB 200 MG/1
CAPSULE ORAL
Qty: 90 CAPSULE | Refills: 1 | Status: SHIPPED | OUTPATIENT
Start: 2023-07-19 | End: 2023-10-09

## 2023-07-19 RX ORDER — AMLODIPINE BESYLATE 10 MG/1
TABLET ORAL
Qty: 90 TABLET | Refills: 3 | Status: SHIPPED | OUTPATIENT
Start: 2023-07-19

## 2023-07-19 RX ORDER — CHOLECALCIFEROL (VITAMIN D3) 125 MCG
CAPSULE ORAL
Status: SHIPPED
Start: 2023-07-19

## 2023-07-19 NOTE — PROGRESS NOTES
Subjective   Patient ID: Sindy Bailey is a 68 y.o. female who presents for Routine FU (Pt in today to establish care for routine 4 month FU).    Review of Systems  Denies N/V/D/C, no HA/S/V, denies rashes/lesions, no CP/SOB. Denies fevers/chills.  All other systems were negative.    Objective   Physical Exam  Gen: NAD  eyes: EOMI, PERRLA  ENT: hearing grossly intact, no nasal discharge  resp: CTABL, without R/R  heart: RRR without MRG  GI: abd: S/ND/NT, BS+  lymph: no axillary, cervical, supraclavicular lymphadenopathy noted   MS: gait grossly WNL,  derm: no rashes or lesions noted  neuro: CN II-XII grossly intact  psych: A&Ox3      Assessment/Plan   Diagnoses and all orders for this visit:  Hypertension, unspecified type  -     amLODIPine (Norvasc) 10 mg tablet; 1 by mouth daily  Drug therapy  -     CBC and Auto Differential; Future  -     Comprehensive Metabolic Panel; Future  -     Magnesium; Future  -     Urinalysis with Reflex Microscopic; Future  Screening for condition  -     TSH with reflex to Free T4 if abnormal; Future  -     Hemoglobin A1C; Future  Preventative health care  -     CBC and Auto Differential; Future  -     Comprehensive Metabolic Panel; Future  -     TSH with reflex to Free T4 if abnormal; Future  -     Magnesium; Future  -     Hemoglobin A1C; Future  -     Urinalysis with Reflex Microscopic; Future  -     Vitamin D 25-Hydroxy,Total; Future  Hip pain, bilateral  -     celecoxib (CeleBREX) 200 mg capsule; 1 by mouth daily with food  Obesity (BMI 30.0-34.9)  Vitamin D deficiency  -     Vitamin D 25-Hydroxy,Total; Future  Other orders  -     cholecalciferol (Vitamin D-3) 125 MCG (5000 UT) capsule; 1 qd         BMI 30.  Looks like patient lost 8 pounds since last appointment at the other office.  She notes that she is not trying to lose weight.    Screening for breast cancer next due January 2024.    Next Medicare wellness will be in 2024. (Patient will return in March 2024 for in office  EKG, Medicare wellness visit and annual lab review.)    Hypertension, blood pressure elevated today.  Patient did not take her meds very early this morning and she also notes she was consuming maybe a little too much salt yesterday.  In general she says her blood pressures are good. -->>  Will refill amlodipine as needed.  Will do EKG at next appointment.    Hyperlipidemia, HDL 65, goal is 45 or more.  LDL is 119, goal is less than 100.  Total to good ratio is 3.3, goal is less than 3.4.  So cholesterol is good on atorvastatin 20 mg daily.    Dehydrated on last labs. -->>  Drink more water, a good goal would be 3 or 4 quarts a day.    Vitamin D deficiency.  Patient on D3 5000 units daily and has been since around May 2023. -->>  We will be checking with annual labs.    Arthritis, hip pain.  Has been taking Tylenol, does not feel it works as well as Celebrex that she used to be on. -->>  We will restart on Celebrex.  We will check kidney function in 3 to 4 months.      - Return in about 4 months for an office EKG and annual lab review annual lab review.  - We are ordering fasting annual labs (minus cholesterol numbers) to get at the [facility] about a week before your next appointment.

## 2023-09-29 ENCOUNTER — TELEPHONE (OUTPATIENT)
Dept: PRIMARY CARE | Facility: CLINIC | Age: 68
End: 2023-09-29
Payer: MEDICARE

## 2023-10-02 NOTE — TELEPHONE ENCOUNTER
We only prescribed the celebrex because she said it works better than the tylenol. We can discuss at next appt. Perhaps a topical, but will have to discuss at an appointment. She can schedule if she wants to discuss before next appt. In the meanwhile she could continue tylenol she was using previously. thanks

## 2023-10-09 ENCOUNTER — HOSPITAL ENCOUNTER (OUTPATIENT)
Dept: RADIOLOGY | Facility: HOSPITAL | Age: 68
Discharge: HOME | End: 2023-10-09
Payer: MEDICARE

## 2023-10-09 ENCOUNTER — OFFICE VISIT (OUTPATIENT)
Dept: PRIMARY CARE | Facility: CLINIC | Age: 68
End: 2023-10-09
Payer: MEDICARE

## 2023-10-09 VITALS
WEIGHT: 176.9 LBS | SYSTOLIC BLOOD PRESSURE: 159 MMHG | DIASTOLIC BLOOD PRESSURE: 98 MMHG | HEART RATE: 99 BPM | BODY MASS INDEX: 30.2 KG/M2 | HEIGHT: 64 IN

## 2023-10-09 DIAGNOSIS — M16.0 OSTEOARTHRITIS OF BOTH HIPS, UNSPECIFIED OSTEOARTHRITIS TYPE: ICD-10-CM

## 2023-10-09 DIAGNOSIS — M79.604 RIGHT LEG PAIN: ICD-10-CM

## 2023-10-09 DIAGNOSIS — M79.604 RIGHT LEG PAIN: Primary | ICD-10-CM

## 2023-10-09 DIAGNOSIS — M25.552 HIP PAIN, BILATERAL: ICD-10-CM

## 2023-10-09 DIAGNOSIS — M25.551 HIP PAIN, BILATERAL: ICD-10-CM

## 2023-10-09 PROCEDURE — 93971 EXTREMITY STUDY: CPT

## 2023-10-09 PROCEDURE — 1159F MED LIST DOCD IN RCRD: CPT | Performed by: FAMILY MEDICINE

## 2023-10-09 PROCEDURE — 1160F RVW MEDS BY RX/DR IN RCRD: CPT | Performed by: FAMILY MEDICINE

## 2023-10-09 PROCEDURE — 3080F DIAST BP >= 90 MM HG: CPT | Performed by: FAMILY MEDICINE

## 2023-10-09 PROCEDURE — 99214 OFFICE O/P EST MOD 30 MIN: CPT | Performed by: FAMILY MEDICINE

## 2023-10-09 PROCEDURE — 93971 EXTREMITY STUDY: CPT | Mod: FOREIGN READ | Performed by: RADIOLOGY

## 2023-10-09 PROCEDURE — 3077F SYST BP >= 140 MM HG: CPT | Performed by: FAMILY MEDICINE

## 2023-10-09 PROCEDURE — 1036F TOBACCO NON-USER: CPT | Performed by: FAMILY MEDICINE

## 2023-10-09 RX ORDER — METHYLPREDNISOLONE 4 MG/1
TABLET ORAL
Qty: 21 TABLET | Refills: 0 | Status: SHIPPED | OUTPATIENT
Start: 2023-10-09 | End: 2023-10-16

## 2023-10-09 NOTE — PROGRESS NOTES
Subjective   Patient ID: Sindy Bailey is a 68 y.o. female who presents for Hip Pain (Pt in today with c/o hip pain / discuss Celebrex).    Review of Systems  Denies N/V/D/C, no HA/S/V, denies rashes/lesions, no CP/SOB. Denies fevers/chills.  Positive for bilateral hip pain, also pain radiating down right leg from hip all the way to the ankle.  Worse than the hip pain.  All other systems were negative.    Objective   Physical Exam  Gen: NAD  eyes: EOMI, PERRLA  ENT: hearing grossly intact, no nasal discharge  resp: CTABL, without R/R  heart: RRR without MRG  GI: abd: S/ND/NT, BS+  lymph: no axillary, cervical, supraclavicular lymphadenopathy noted   MS: Ambulating slowly with a cane derm: no rashes or lesions noted  neuro: CN II-XII grossly intact  psych: A&Ox3    Assessment/Plan   Diagnoses and all orders for this visit:  Right leg pain  -     Vascular US lower extremity venous duplex bilateral; Future  Hip pain, bilateral  Osteoarthritis of both hips, unspecified osteoarthritis type        Arthritis, hip pain.  Couple years ago was prescribed Mobic sounds like that was ineffective, we recently tried Celebrex, patient notes that ineffective.  She still takes Tylenol which sounds like that may be doing more than the Celebrex.  -->>  Consider a Toradol shot but with possible DVT, do not want anything to interfere with blood thinners if we end up prescribing those.  We will try prescribing a Medrol Dosepak to see if that helps with the arthritis pain.  Return as already scheduled.     Right leg pain, was first thinking it was knee arthritis but sounds like not be related to the arthritis.  Has had this about 6 weeks.  Pretty much the whole leg.  Achiness is pretty much constant.  Denies swelling or heat. -->>  We will order ultrasound.  If the ultrasound is negative, will then refer to orthopedics for further evaluation and treatment.  Will be sure to schedule on a Monday.    - Am ordering stat right lower  extremity ultrasound to rule out DVT to be scheduled today if possible.  - Patient is already scheduled for appointment next month.

## 2023-10-11 ENCOUNTER — TELEPHONE (OUTPATIENT)
Dept: PRIMARY CARE | Facility: CLINIC | Age: 68
End: 2023-10-11
Payer: MEDICARE

## 2023-10-11 NOTE — TELEPHONE ENCOUNTER
Patient was informed that her ultrasound was negative for a DVT.  wanted her to see orthopedic. She has an appointment with Dr. Lalo Marino on 10/23/2023

## 2023-10-23 ENCOUNTER — OFFICE VISIT (OUTPATIENT)
Dept: ORTHOPEDIC SURGERY | Facility: CLINIC | Age: 68
End: 2023-10-23
Payer: MEDICARE

## 2023-10-23 ENCOUNTER — ANCILLARY PROCEDURE (OUTPATIENT)
Dept: RADIOLOGY | Facility: CLINIC | Age: 68
End: 2023-10-23
Payer: MEDICARE

## 2023-10-23 DIAGNOSIS — M25.551 HIP PAIN, RIGHT: Primary | ICD-10-CM

## 2023-10-23 DIAGNOSIS — M25.551 HIP PAIN, RIGHT: ICD-10-CM

## 2023-10-23 DIAGNOSIS — M16.11 PRIMARY OSTEOARTHRITIS OF RIGHT HIP: ICD-10-CM

## 2023-10-23 DIAGNOSIS — M79.604 RIGHT LEG PAIN: ICD-10-CM

## 2023-10-23 PROCEDURE — 99214 OFFICE O/P EST MOD 30 MIN: CPT | Performed by: ORTHOPAEDIC SURGERY

## 2023-10-23 PROCEDURE — 1036F TOBACCO NON-USER: CPT | Performed by: ORTHOPAEDIC SURGERY

## 2023-10-23 PROCEDURE — 1159F MED LIST DOCD IN RCRD: CPT | Performed by: ORTHOPAEDIC SURGERY

## 2023-10-23 PROCEDURE — 1160F RVW MEDS BY RX/DR IN RCRD: CPT | Performed by: ORTHOPAEDIC SURGERY

## 2023-10-23 PROCEDURE — 73502 X-RAY EXAM HIP UNI 2-3 VIEWS: CPT | Mod: RIGHT SIDE | Performed by: ORTHOPAEDIC SURGERY

## 2023-10-23 PROCEDURE — 73502 X-RAY EXAM HIP UNI 2-3 VIEWS: CPT | Mod: RT,FY

## 2023-10-23 RX ORDER — DICLOFENAC SODIUM 75 MG/1
75 TABLET, DELAYED RELEASE ORAL 2 TIMES DAILY
Qty: 30 TABLET | Refills: 2 | Status: SHIPPED | OUTPATIENT
Start: 2023-10-23 | End: 2024-01-21

## 2023-10-23 NOTE — PROGRESS NOTES
History: Sindy is here for her right leg.  She getting pain from the hip extending down past the knee into the ankle and foot.  She gets occasional numbness and tingling.  She has known arthritis in the hip joint.  She tried Celebrex without much relief.    Past medical history: Multiple  Medications: Multiple  Allergies: No known drug allergies    Please refer to the intake H&P regarding the patient's review of systems, family history and social history as was done today    HEENT: Normal  Lungs: Clear to auscultation  Heart: RRR  Abdomen: Soft, nontender  Skin: clear  Extremity: She has pain in the groin with any hip rotation or flexion maneuvers.  It does radiate down past the knee.  She has pain with straight leg raise.  She has good foot and ankle motion today.  5 out of 5 strength distally.  Minimal pain around the knee itself.  No numbness today.  Contralateral exam is normal for strength, motion, stability and neurovascular assessment.    Radiographs: AP lateral views of the right hip show severe end-stage DJD with bone-on-bone articulation and slight superior femoral head collapse.    Assessment: End-stage right hip DJD    Plan: We discussed that her hip arthritis is likely getting worse.  Most of her pain down the leg seems to be from the hip joint itself.  She wants to avoid surgery for the time being advised to try different medication.  We will try her on some Voltaren and she will stop because any stomach upset.  We also discussed an intra-articular injection trial and she can call for referral if needed.  We did discuss follow-up with one of our partners who is a hip specialist to consider arthroplasty when she gets to that point.  All questions were answered today with the patient.    This note was generated with voice recognition software and may contain grammatical errors.

## 2023-11-15 ENCOUNTER — LAB (OUTPATIENT)
Dept: LAB | Facility: LAB | Age: 68
End: 2023-11-15
Payer: MEDICARE

## 2023-11-15 DIAGNOSIS — Z00.00 PREVENTATIVE HEALTH CARE: ICD-10-CM

## 2023-11-15 DIAGNOSIS — Z13.9 SCREENING FOR CONDITION: ICD-10-CM

## 2023-11-15 DIAGNOSIS — E55.9 VITAMIN D DEFICIENCY: ICD-10-CM

## 2023-11-15 DIAGNOSIS — Z79.899 DRUG THERAPY: ICD-10-CM

## 2023-11-15 LAB
25(OH)D3 SERPL-MCNC: 65 NG/ML (ref 30–100)
ALBUMIN SERPL BCP-MCNC: 4.5 G/DL (ref 3.4–5)
ALP SERPL-CCNC: 89 U/L (ref 33–136)
ALT SERPL W P-5'-P-CCNC: 19 U/L (ref 7–45)
ANION GAP SERPL CALC-SCNC: 13 MMOL/L (ref 10–20)
APPEARANCE UR: ABNORMAL
AST SERPL W P-5'-P-CCNC: 17 U/L (ref 9–39)
BASOPHILS # BLD AUTO: 0.01 X10*3/UL (ref 0–0.1)
BASOPHILS NFR BLD AUTO: 0.2 %
BILIRUB SERPL-MCNC: 0.3 MG/DL (ref 0–1.2)
BILIRUB UR STRIP.AUTO-MCNC: NEGATIVE MG/DL
BUN SERPL-MCNC: 25 MG/DL (ref 6–23)
CALCIUM SERPL-MCNC: 9.6 MG/DL (ref 8.6–10.3)
CAOX CRY #/AREA UR COMP ASSIST: ABNORMAL /HPF
CHLORIDE SERPL-SCNC: 101 MMOL/L (ref 98–107)
CO2 SERPL-SCNC: 30 MMOL/L (ref 21–32)
COLOR UR: YELLOW
CREAT SERPL-MCNC: 0.77 MG/DL (ref 0.5–1.05)
EOSINOPHIL # BLD AUTO: 0.06 X10*3/UL (ref 0–0.7)
EOSINOPHIL NFR BLD AUTO: 1 %
ERYTHROCYTE [DISTWIDTH] IN BLOOD BY AUTOMATED COUNT: 13.5 % (ref 11.5–14.5)
EST. AVERAGE GLUCOSE BLD GHB EST-MCNC: 111 MG/DL
GFR SERPL CREATININE-BSD FRML MDRD: 85 ML/MIN/1.73M*2
GLUCOSE SERPL-MCNC: 86 MG/DL (ref 74–99)
GLUCOSE UR STRIP.AUTO-MCNC: NEGATIVE MG/DL
HBA1C MFR BLD: 5.5 %
HCT VFR BLD AUTO: 38.8 % (ref 36–46)
HGB BLD-MCNC: 12.5 G/DL (ref 12–16)
IMM GRANULOCYTES # BLD AUTO: 0.01 X10*3/UL (ref 0–0.7)
IMM GRANULOCYTES NFR BLD AUTO: 0.2 % (ref 0–0.9)
KETONES UR STRIP.AUTO-MCNC: NEGATIVE MG/DL
LEUKOCYTE ESTERASE UR QL STRIP.AUTO: ABNORMAL
LYMPHOCYTES # BLD AUTO: 1.6 X10*3/UL (ref 1.2–4.8)
LYMPHOCYTES NFR BLD AUTO: 28 %
MAGNESIUM SERPL-MCNC: 2.24 MG/DL (ref 1.6–2.4)
MCH RBC QN AUTO: 33.1 PG (ref 26–34)
MCHC RBC AUTO-ENTMCNC: 32.2 G/DL (ref 32–36)
MCV RBC AUTO: 103 FL (ref 80–100)
MONOCYTES # BLD AUTO: 0.46 X10*3/UL (ref 0.1–1)
MONOCYTES NFR BLD AUTO: 8 %
MUCOUS THREADS #/AREA URNS AUTO: ABNORMAL /LPF
NEUTROPHILS # BLD AUTO: 3.58 X10*3/UL (ref 1.2–7.7)
NEUTROPHILS NFR BLD AUTO: 62.6 %
NITRITE UR QL STRIP.AUTO: NEGATIVE
NRBC BLD-RTO: 0 /100 WBCS (ref 0–0)
PH UR STRIP.AUTO: 6 [PH]
PLATELET # BLD AUTO: 265 X10*3/UL (ref 150–450)
POTASSIUM SERPL-SCNC: 4.1 MMOL/L (ref 3.5–5.3)
PROT SERPL-MCNC: 7.4 G/DL (ref 6.4–8.2)
PROT UR STRIP.AUTO-MCNC: NEGATIVE MG/DL
RBC # BLD AUTO: 3.78 X10*6/UL (ref 4–5.2)
RBC # UR STRIP.AUTO: NEGATIVE /UL
RBC #/AREA URNS AUTO: ABNORMAL /HPF
SODIUM SERPL-SCNC: 140 MMOL/L (ref 136–145)
SP GR UR STRIP.AUTO: 1.02
SQUAMOUS #/AREA URNS AUTO: ABNORMAL /HPF
TSH SERPL-ACNC: 2.48 MIU/L (ref 0.44–3.98)
UROBILINOGEN UR STRIP.AUTO-MCNC: <2 MG/DL
WBC # BLD AUTO: 5.7 X10*3/UL (ref 4.4–11.3)
WBC #/AREA URNS AUTO: ABNORMAL /HPF

## 2023-11-15 PROCEDURE — 82306 VITAMIN D 25 HYDROXY: CPT

## 2023-11-15 PROCEDURE — 81001 URINALYSIS AUTO W/SCOPE: CPT

## 2023-11-15 PROCEDURE — 80053 COMPREHEN METABOLIC PANEL: CPT

## 2023-11-15 PROCEDURE — 83036 HEMOGLOBIN GLYCOSYLATED A1C: CPT

## 2023-11-15 PROCEDURE — 36415 COLL VENOUS BLD VENIPUNCTURE: CPT

## 2023-11-15 PROCEDURE — 84443 ASSAY THYROID STIM HORMONE: CPT

## 2023-11-15 PROCEDURE — 85025 COMPLETE CBC W/AUTO DIFF WBC: CPT

## 2023-11-15 PROCEDURE — 83735 ASSAY OF MAGNESIUM: CPT

## 2023-11-22 ENCOUNTER — OFFICE VISIT (OUTPATIENT)
Dept: PRIMARY CARE | Facility: CLINIC | Age: 68
End: 2023-11-22
Payer: MEDICARE

## 2023-11-22 VITALS
SYSTOLIC BLOOD PRESSURE: 130 MMHG | DIASTOLIC BLOOD PRESSURE: 86 MMHG | HEIGHT: 64 IN | WEIGHT: 172 LBS | BODY MASS INDEX: 29.37 KG/M2 | HEART RATE: 80 BPM | OXYGEN SATURATION: 95 %

## 2023-11-22 DIAGNOSIS — I10 HYPERTENSION, UNSPECIFIED TYPE: Primary | ICD-10-CM

## 2023-11-22 DIAGNOSIS — Z00.00 PREVENTATIVE HEALTH CARE: ICD-10-CM

## 2023-11-22 DIAGNOSIS — M25.551 HIP PAIN, BILATERAL: ICD-10-CM

## 2023-11-22 DIAGNOSIS — E55.9 VITAMIN D DEFICIENCY: ICD-10-CM

## 2023-11-22 DIAGNOSIS — M25.552 HIP PAIN, BILATERAL: ICD-10-CM

## 2023-11-22 DIAGNOSIS — R82.998 CALCIUM OXALATE CRYSTALS IN URINE: ICD-10-CM

## 2023-11-22 DIAGNOSIS — E86.0 DEHYDRATION: ICD-10-CM

## 2023-11-22 DIAGNOSIS — M25.50 ARTHRALGIA, UNSPECIFIED JOINT: ICD-10-CM

## 2023-11-22 PROCEDURE — 3075F SYST BP GE 130 - 139MM HG: CPT | Performed by: FAMILY MEDICINE

## 2023-11-22 PROCEDURE — 3079F DIAST BP 80-89 MM HG: CPT | Performed by: FAMILY MEDICINE

## 2023-11-22 PROCEDURE — 1159F MED LIST DOCD IN RCRD: CPT | Performed by: FAMILY MEDICINE

## 2023-11-22 PROCEDURE — 99215 OFFICE O/P EST HI 40 MIN: CPT | Performed by: FAMILY MEDICINE

## 2023-11-22 PROCEDURE — 93000 ELECTROCARDIOGRAM COMPLETE: CPT | Performed by: FAMILY MEDICINE

## 2023-11-22 PROCEDURE — 1160F RVW MEDS BY RX/DR IN RCRD: CPT | Performed by: FAMILY MEDICINE

## 2023-11-22 PROCEDURE — 1036F TOBACCO NON-USER: CPT | Performed by: FAMILY MEDICINE

## 2023-11-22 NOTE — PROGRESS NOTES
Subjective   Patient ID: Sindy Bailey is a 68 y.o. female who presents for 4 Month FU / Lab Review / IO EKG (Pt in today for routine 4 Month FU / Lab Review / IO EKG).    Review of Systems  Denies N/V/D/C, no HA/S/V, denies rashes/lesions, no CP/SOB. Denies fevers/chills.  All other systems were negative.    Objective   Physical Exam  Gen: NAD  eyes: EOMI, PERRLA  ENT: hearing grossly intact, no nasal discharge  resp: CTABL, without R/R  heart: RRR without MRG  GI: abd: S/ND/NT, BS+  lymph: no axillary, cervical, supraclavicular lymphadenopathy noted   MS: gait grossly WNL,  derm: no rashes or lesions noted  neuro: CN II-XII grossly intact  psych: A&Ox3      Assessment/Plan   Diagnoses and all orders for this visit:  Hypertension, unspecified type  -     ECG 12 lead (Clinic Performed)  Preventative health care  -     ECG 12 lead (Clinic Performed)  Vitamin D deficiency  -     Vitamin D 25-Hydroxy,Total (for eval of Vitamin D levels); Future  Dehydration  -     Urinalysis with Reflex Microscopic; Future  Calcium oxalate crystals in urine  -     Urinalysis with Reflex Microscopic; Future         BMI 29.  Down another 6  pounds since last appointment here.  Does note she is improved diet, eating more vegetables, less red meat.  Also is walking a lot.-->>  Keep up the excellent work.  She notes that she is not trying to lose weight.    Screening for breast cancer next due January 2024.    Next Medicare wellness will be in 2024. (Patient will return in March 2024 for in office EKG, Medicare wellness visit and annual lab review.)    Hypertension, blood pressure elevated today. In general she says her blood pressures are good. -->> Will refill amlodipine as needed.  Patient is going to start checking blood pressures at rest at home.  Try to do once or twice a week for at least a few weeks before your next appointment and write down numbers and bring them to the appointment.      New annual labs reviewed:  - Drug  therapy, screening for conditions.  A1c is 5.5, so no diabetes.    - Calcium oxalate crystals noted in urinalysis. -->>  Very important that you increase your water intake.  You might be growing kidney stones.  More water can help prevent that.    - Dehydrated again on labs. -->>  Drink more water, a good goal would be 3 or 4 quarts a day.    - Vitamin D deficiency.  65 on this lab.  Goal is .  Patient on D3 2000 units daily, still he previously was on 5000 units for couple of months. -->>  Increase to 5000 units daily.  Could take 4000 and 6000 alternating days and get the same effect.  Will recheck in about 6 months.      Regarding previous labs:  - Hyperlipidemia, HDL 65, goal is 45 or more.  LDL is 119, goal is less than 100.  Total to good ratio is 3.3, goal is less than 3.4.  So cholesterol is good on atorvastatin 20 mg daily.  Also recommending starting coenzyme Q 10, 200 mg daily.  Can get it at [employer] for about $0.11 a pill.  Can get it at [employer] for about $0.20 a pill, but with your savings card it sounds like it will be free.      Arthritis, hip pain.  Celebrex was ineffective.  Seeing orthopedics, now on diclofenac, patient feels that is working better.  We prescribed a rollator.      - Will recheck blood pressure at rest before patient leaves.  - Return around April 1 for annual Medicare wellness visit, lab review.  - We are ordering vitamin D and urinalysis to get at the [facility] about a week before your next appointment.

## 2023-11-22 NOTE — PATIENT INSTRUCTIONS
BMI 29.  Down another 6  pounds since last appointment here.  Does note she is improved diet, eating more vegetables, less red meat.  Also is walking a lot.-->>  Keep up the excellent work.  She notes that she is not trying to lose weight.    Screening for breast cancer next due January 2024.    Next Medicare wellness will be in 2024. (Patient will return in March 2024 for in office EKG, Medicare wellness visit and annual lab review.)    Hypertension, blood pressure elevated today. In general she says her blood pressures are good. -->> Will refill amlodipine as needed.  Patient is going to start checking blood pressures at rest at home.  Try to do once or twice a week for at least a few weeks before your next appointment and write down numbers and bring them to the appointment.      New annual labs reviewed:  - Drug therapy, screening for conditions.  A1c is 5.5, so no diabetes.    - Calcium oxalate crystals noted in urinalysis. -->>  Very important that you increase your water intake.  You might be growing kidney stones.  More water can help prevent that.    - Dehydrated again on labs. -->>  Drink more water, a good goal would be 3 or 4 quarts a day.    - Vitamin D deficiency.  65 on this lab.  Goal is .  Patient on D3 2000 units daily, still he previously was on 5000 units for couple of months. -->>  Increase to 5000 units daily.  Could take 4000 and 6000 alternating days and get the same effect.  Will recheck in about 6 months.      Regarding previous labs:  - Hyperlipidemia, HDL 65, goal is 45 or more.  LDL is 119, goal is less than 100.  Total to good ratio is 3.3, goal is less than 3.4.  So cholesterol is good on atorvastatin 20 mg daily.  Also recommending starting coenzyme Q 10, 200 mg daily.  Can get it at [employer] for about $0.11 a pill.  Can get it at [employer] for about $0.20 a pill, but with your savings card it sounds like it will be free.      Arthritis, hip pain.  Celebrex was ineffective.   Seeing orthopedics, now on diclofenac, patient feels that is working better.        - Will recheck blood pressure at rest before patient leaves.  - Return around April 1 for annual Medicare wellness visit, lab review.  - We are ordering vitamin D and urinalysis to get at the [facility] about a week before your next appointment.

## 2024-01-24 ENCOUNTER — TELEPHONE (OUTPATIENT)
Dept: PRIMARY CARE | Facility: CLINIC | Age: 69
End: 2024-01-24
Payer: MEDICARE

## 2024-03-07 NOTE — PATIENT INSTRUCTIONS
BMI 30.  Looks like patient lost 8 pounds since last appointment at the other office.  She notes that she is not trying to lose weight.    Screening for breast cancer next due January 2024.    Next Medicare wellness will be in 2024. (Patient will return in March 2024 for in office EKG, Medicare wellness visit and annual lab review.)    Hypertension, blood pressure elevated today.  Patient did not take her meds very early this morning and she also notes she was consuming maybe a little too much salt yesterday.  In general she says her blood pressures are good. -->>  Will refill amlodipine as needed.  Will do EKG at next appointment.    Hyperlipidemia, HDL 65, goal is 45 or more.  LDL is 119, goal is less than 100.  Total to good ratio is 3.3, goal is less than 3.4.  So cholesterol is good on atorvastatin 20 mg daily.    Dehydrated on last labs. -->>  Drink more water, a good goal would be 3 or 4 quarts a day.    Vitamin D deficiency.  Patient on D3 5000 units daily and has been since around May 2023. -->>  We will be checking with annual labs.    Arthritis, hip pain.  Has been taking Tylenol, does not feel it works as well as Celebrex that she used to be on. -->>  We will restart on Celebrex.  We will check kidney function in 3 to 4 months.      - Return in about 4 months for an office EKG and annual lab review annual lab review.  - We are ordering fasting annual labs (minus cholesterol numbers) to get at the [facility] about a week before your next appointment  
Calm

## 2024-04-18 ENCOUNTER — LAB (OUTPATIENT)
Dept: LAB | Facility: LAB | Age: 69
End: 2024-04-18
Payer: MEDICARE

## 2024-04-18 DIAGNOSIS — E55.9 VITAMIN D DEFICIENCY: ICD-10-CM

## 2024-04-18 DIAGNOSIS — R82.998 CALCIUM OXALATE CRYSTALS IN URINE: ICD-10-CM

## 2024-04-18 DIAGNOSIS — E86.0 DEHYDRATION: ICD-10-CM

## 2024-04-18 LAB
25(OH)D3 SERPL-MCNC: 57 NG/ML (ref 30–100)
APPEARANCE UR: ABNORMAL
BILIRUB UR STRIP.AUTO-MCNC: NEGATIVE MG/DL
COLOR UR: YELLOW
GLUCOSE UR STRIP.AUTO-MCNC: NEGATIVE MG/DL
KETONES UR STRIP.AUTO-MCNC: NEGATIVE MG/DL
LEUKOCYTE ESTERASE UR QL STRIP.AUTO: NEGATIVE
NITRITE UR QL STRIP.AUTO: NEGATIVE
PH UR STRIP.AUTO: 7 [PH]
PROT UR STRIP.AUTO-MCNC: NEGATIVE MG/DL
RBC # UR STRIP.AUTO: NEGATIVE /UL
SP GR UR STRIP.AUTO: 1.02
UROBILINOGEN UR STRIP.AUTO-MCNC: <2 MG/DL

## 2024-04-18 PROCEDURE — 81003 URINALYSIS AUTO W/O SCOPE: CPT

## 2024-04-18 PROCEDURE — 82306 VITAMIN D 25 HYDROXY: CPT

## 2024-04-18 PROCEDURE — 36415 COLL VENOUS BLD VENIPUNCTURE: CPT

## 2024-04-24 ENCOUNTER — OFFICE VISIT (OUTPATIENT)
Dept: PRIMARY CARE | Facility: CLINIC | Age: 69
End: 2024-04-24
Payer: MEDICARE

## 2024-04-24 VITALS
OXYGEN SATURATION: 95 % | HEART RATE: 103 BPM | DIASTOLIC BLOOD PRESSURE: 73 MMHG | HEIGHT: 64 IN | BODY MASS INDEX: 28.68 KG/M2 | SYSTOLIC BLOOD PRESSURE: 147 MMHG | WEIGHT: 168 LBS

## 2024-04-24 DIAGNOSIS — Z00.00 PREVENTATIVE HEALTH CARE: ICD-10-CM

## 2024-04-24 DIAGNOSIS — M25.551 PAIN OF BOTH HIP JOINTS: ICD-10-CM

## 2024-04-24 DIAGNOSIS — M25.552 PAIN OF BOTH HIP JOINTS: ICD-10-CM

## 2024-04-24 DIAGNOSIS — M25.551 HIP PAIN, BILATERAL: ICD-10-CM

## 2024-04-24 DIAGNOSIS — E55.9 VITAMIN D DEFICIENCY: ICD-10-CM

## 2024-04-24 DIAGNOSIS — M25.552 HIP PAIN, BILATERAL: ICD-10-CM

## 2024-04-24 DIAGNOSIS — Z12.11 ENCOUNTER FOR SCREENING FOR MALIGNANT NEOPLASM OF COLON: ICD-10-CM

## 2024-04-24 DIAGNOSIS — I10 HYPERTENSION, UNSPECIFIED TYPE: ICD-10-CM

## 2024-04-24 DIAGNOSIS — Z13.9 SCREENING FOR CONDITION: ICD-10-CM

## 2024-04-24 DIAGNOSIS — Z12.31 ENCOUNTER FOR SCREENING MAMMOGRAM FOR MALIGNANT NEOPLASM OF BREAST: ICD-10-CM

## 2024-04-24 DIAGNOSIS — E86.0 DEHYDRATION: ICD-10-CM

## 2024-04-24 DIAGNOSIS — Z79.899 DRUG THERAPY: ICD-10-CM

## 2024-04-24 DIAGNOSIS — Z11.59 NEED FOR HEPATITIS C SCREENING TEST: ICD-10-CM

## 2024-04-24 DIAGNOSIS — E78.5 HYPERLIPIDEMIA, UNSPECIFIED HYPERLIPIDEMIA TYPE: ICD-10-CM

## 2024-04-24 DIAGNOSIS — Z00.00 ROUTINE GENERAL MEDICAL EXAMINATION AT HEALTH CARE FACILITY: Primary | ICD-10-CM

## 2024-04-24 PROCEDURE — 1170F FXNL STATUS ASSESSED: CPT | Performed by: FAMILY MEDICINE

## 2024-04-24 PROCEDURE — 99215 OFFICE O/P EST HI 40 MIN: CPT | Performed by: FAMILY MEDICINE

## 2024-04-24 PROCEDURE — 3078F DIAST BP <80 MM HG: CPT | Performed by: FAMILY MEDICINE

## 2024-04-24 PROCEDURE — 3077F SYST BP >= 140 MM HG: CPT | Performed by: FAMILY MEDICINE

## 2024-04-24 PROCEDURE — 1159F MED LIST DOCD IN RCRD: CPT | Performed by: FAMILY MEDICINE

## 2024-04-24 PROCEDURE — 99397 PER PM REEVAL EST PAT 65+ YR: CPT | Performed by: FAMILY MEDICINE

## 2024-04-24 PROCEDURE — G0439 PPPS, SUBSEQ VISIT: HCPCS | Performed by: FAMILY MEDICINE

## 2024-04-24 RX ORDER — DICLOFENAC SODIUM 75 MG/1
TABLET, DELAYED RELEASE ORAL
Qty: 180 TABLET | Refills: 1 | Status: SHIPPED | OUTPATIENT
Start: 2024-04-24

## 2024-04-24 ASSESSMENT — ACTIVITIES OF DAILY LIVING (ADL)
GROCERY_SHOPPING: INDEPENDENT
DOING_HOUSEWORK: INDEPENDENT
TAKING_MEDICATION: INDEPENDENT
BATHING: INDEPENDENT
DRESSING: INDEPENDENT
MANAGING_FINANCES: INDEPENDENT

## 2024-04-24 ASSESSMENT — ENCOUNTER SYMPTOMS
DEPRESSION: 0
LOSS OF SENSATION IN FEET: 0
OCCASIONAL FEELINGS OF UNSTEADINESS: 0

## 2024-04-24 NOTE — PROGRESS NOTES
Subjective   Patient ID: Sindy Bailey is a 68 y.o. female who presents for MCW / Lab REview (Pt in today for her Medicare Wellness / Lab Review FU).    Review of Systems  Denies N/V/D/C, no HA/S/V, denies rashes/lesions, no CP/SOB. Denies fevers/chills.  Positive for bilateral leg pain and bilateral hip pain.  All other systems were negative.    Objective   Physical Exam  Gen: NAD  eyes: EOMI, PERRLA  ENT: hearing grossly intact, no nasal discharge  resp: CTABL, without R/R  heart: RRR without MRG  GI: abd: S/ND/NT, BS+  lymph: no axillary, cervical, supraclavicular lymphadenopathy noted   MS: gait grossly WNL,  derm: no rashes or lesions noted  neuro: CN II-XII grossly intact  psych: A&Ox3      Assessment/Plan   Diagnoses and all orders for this visit:  Preventative health care  -     Cologuard® colon cancer screening; Future  -     CBC and Auto Differential; Future  -     Comprehensive Metabolic Panel; Future  -     TSH with reflex to Free T4 if abnormal; Future  -     Magnesium; Future  -     Lipid Panel; Future  -     Hemoglobin A1C; Future  -     Urinalysis with Reflex Microscopic; Future  -     Vitamin D 25-Hydroxy,Total (for eval of Vitamin D levels); Future  -     Hepatitis C Antibody; Future  Encounter for screening mammogram for malignant neoplasm of breast  -     BI mammo bilateral screening tomosynthesis; Future  Encounter for screening for malignant neoplasm of colon  -     Cologuard® colon cancer screening; Future  Hip pain, bilateral  -     diclofenac (Voltaren) 75 mg EC tablet; 1 by mouth with food twice daily as needed for arthritis pain.  -     Referral to Orthopaedic Surgery; Future  Drug therapy  -     CBC and Auto Differential; Future  -     Comprehensive Metabolic Panel; Future  -     Magnesium; Future  -     Urinalysis with Reflex Microscopic; Future  Screening for condition  -     TSH with reflex to Free T4 if abnormal; Future  -     Lipid Panel; Future  -     Hemoglobin A1C; Future  Need  for hepatitis C screening test  -     Hepatitis C Antibody; Future  Pain of both hip joints  Hypertension, unspecified type  Vitamin D deficiency  -     Vitamin D 25-Hydroxy,Total (for eval of Vitamin D levels); Future  Dehydration  Hyperlipidemia, unspecified hyperlipidemia type  -     Lipid Panel; Future      Doing annual preventative visit, Medicare wellness visit, and lab review.    -----    Screening for breast cancer; -->> ordered mammogram.    Screening for colon cancer;  Patient denies any family history of colon cancer. -->>  Order Cologuard.    Screening for hepatitis C; will be done next time we do labs.      Immunization counseling: Due for annual flu shot as well as the latest coronavirus booster.  Also due for the new RSV shot, shingles series, pneumonia shot, and tetanus. -->> Check with your pharmacy to keep up to date on immunizations.    -----    Overweight: BMI 29.  Down another 4  pounds since last appointment here.  Does note she is improved diet, eating more vegetables/salads, less red meat.  Also is walking a lot.-->> Keep up the excellent work.        Hypertension: blood pressure elevated today. In general she says her blood pressures are good. -->> Will refill amlodipine as needed.  continue checking blood pressures at rest at home, and write down numbers and bring them to the appointment.      labs reviewed:    - Vitamin D deficiency.  57, was 65.  Goal is .  Patient on D3 5000 units daily, so a total of 35,000 units weekly. -->>  Increase to 50,000 units weekly, so could take 5000 every day but Monday Wednesday Friday take 10,000.  Will recheck in about 6-12 months.      - Calcium oxalate crystals resolved.  Since patient had has begun drinking more water. -->>  Continue to drink plenty of water and stay well-hydrated.      Regarding previous labs:    - Drug therapy, screening for conditions.  A1c is 5.5, so no diabetes.    - Dehydrated again on labs.  Since this lab was done,  patient has begun drinking more water. -->>  Continue drinking plenty of water.    - Hyperlipidemia, HDL 65, goal is 45 or more.  LDL is 119, goal is less than 100.  Total to good ratio is 3.3, goal is less than 3.4.  So cholesterol is good on atorvastatin 20 mg daily plus co-Q10 at.        Arthritis, hip pain, bilateral leg pain.  Celebrex was ineffective, diclofenac did better.  Tylenol at night when laying down is effective for evening use.  Saw orthopedics, they suggested trying the diclofenac and if that did not work well enough to come back for shots.  Diclofenac was helpful if not enough.  Topical Voltaren is also helpful and she is using it daily but it is also not helping enough.  Pt has Rolator which helps her accomplish activities of daily living.  Recently getting generalized bilateral upper and lower leg pain, described as achy.  Better when walking then sitting down. -->>  Will represcribe some diclofenac for the hip pain.  Also suggest you might try getting over-the-counter capsaicin topical. Will also refer back to orthopedics for consideration of injections.  If your generalized leg achiness is not doing well enough, call us and I will try prescribing Cymbalta.      - Return in about 6 months for in office ECG and annual lab review.  - Patient will come in about a week before the appointment to get fasting annual labs including hepatitis C screening.

## 2024-04-24 NOTE — PATIENT INSTRUCTIONS
Doing annual preventative visit, Medicare wellness visit, and lab review.    -----    Screening for breast cancer; -->> ordered mammogram.    Screening for colon cancer;  Patient denies any family history of colon cancer. -->>  Order Cologuard.    Screening for hepatitis C; will be done next time we do labs.      Immunization counseling: Due for annual flu shot as well as the latest coronavirus booster.  Also due for the new RSV shot, shingles series, pneumonia shot, and tetanus. -->> Check with your pharmacy to keep up to date on immunizations.    -----    Overweight: BMI 29.  Down another 4  pounds since last appointment here.  Does note she is improved diet, eating more vegetables/salads, less red meat.  Also is walking a lot.-->> Keep up the excellent work.        Hypertension: blood pressure elevated today. In general she says her blood pressures are good. -->> Will refill amlodipine as needed.  continue checking blood pressures at rest at home, and write down numbers and bring them to the appointment.      labs reviewed:    - Vitamin D deficiency.  57, was 65.  Goal is .  Patient on D3 5000 units daily, so a total of 35,000 units weekly. -->>  Increase to 50,000 units weekly, so could take 5000 every day but Monday Wednesday Friday take 10,000.  Will recheck in about 6-12 months.      - Calcium oxalate crystals resolved.  Since patient had has begun drinking more water. -->>  Continue to drink plenty of water and stay well-hydrated.      Regarding previous labs:    - Drug therapy, screening for conditions.  A1c is 5.5, so no diabetes.    - Dehydrated again on labs.  Since this lab was done, patient has begun drinking more water. -->>  Continue drinking plenty of water.    - Hyperlipidemia, HDL 65, goal is 45 or more.  LDL is 119, goal is less than 100.  Total to good ratio is 3.3, goal is less than 3.4.  So cholesterol is good on atorvastatin 20 mg daily plus co-Q10 at.        Arthritis, hip pain,  bilateral leg pain.  Celebrex was ineffective, diclofenac did better.  Tylenol at night when laying down is effective for evening use.  Saw orthopedics, they suggested trying the diclofenac and if that did not work well enough to come back for shots.  Diclofenac was helpful if not enough.  Topical Voltaren is also helpful and she is using it daily but it is also not helping enough.  Pt has Rolator which helps her accomplish activities of daily living.  Recently getting generalized bilateral upper and lower leg pain, described as achy.  Better when walking then sitting down. -->>  Will represcribe some diclofenac for the hip pain.  Also suggest you might try getting over-the-counter capsaicin topical. Will also refer back to orthopedics for consideration of injections.  If your generalized leg achiness is not doing well enough, call us and I will try prescribing Cymbalta.      - Return in about 6 months for in office ECG and annual lab review.  - Patient will come in about a week before the appointment to get fasting annual labs including hepatitis C screening.

## 2024-05-06 ENCOUNTER — OFFICE VISIT (OUTPATIENT)
Dept: ORTHOPEDIC SURGERY | Facility: CLINIC | Age: 69
End: 2024-05-06
Payer: MEDICARE

## 2024-05-06 DIAGNOSIS — M25.551 HIP PAIN, BILATERAL: ICD-10-CM

## 2024-05-06 DIAGNOSIS — M16.0 PRIMARY OSTEOARTHRITIS OF BOTH HIPS: ICD-10-CM

## 2024-05-06 DIAGNOSIS — M25.552 HIP PAIN, BILATERAL: ICD-10-CM

## 2024-05-06 PROCEDURE — 99213 OFFICE O/P EST LOW 20 MIN: CPT | Performed by: ORTHOPAEDIC SURGERY

## 2024-05-06 PROCEDURE — 99214 OFFICE O/P EST MOD 30 MIN: CPT | Performed by: ORTHOPAEDIC SURGERY

## 2024-05-06 PROCEDURE — 1159F MED LIST DOCD IN RCRD: CPT | Performed by: ORTHOPAEDIC SURGERY

## 2024-05-06 NOTE — PROGRESS NOTES
History: Sindy is here for her hips.  She has pain in both hips worse on the right than the left.  She got decent relief for several months with an intra-articular injection last fall.  She has known hip arthritis on both sides.    Past medical history: Multiple  Medications: Multiple  Allergies: No known drug allergies    Please refer to the intake H&P regarding the patient's review of systems, family history and social history as was done today    HEENT: Normal  Lungs: Clear to auscultation  Heart: RRR  Abdomen: Soft, nontender  Skin: clear  Extremity: She has pain with hip rotation in the groin worse on the right than the left.  Mild pain with resisted hip flexion maneuvers as well.  No numbness or tingling on either side.  Negative straight leg raise.  Upper extremity exam is normal for strength, motion, stability and neurovascular assessment.    Radiographs: AP lateral views of the right hip from last year show end-stage hip DJD with spurring throughout.  Similar hip arthritis on the left from x-rays several years ago.    Assessment: End-stage bilateral hip DJD    Plan: She did get decent relief with a hip injection last year would like to try it again for both sides now.  We will submit for approval and she can proceed at her discretion.  If having persistent pain she can call to follow-up with one of our hip specialist Paulina to discuss further options including arthroplasty.  She does not feel she is ready for surgery just yet.  All questions were answered today with the patient.    This note was generated with voice recognition software and may contain grammatical errors.

## 2024-05-21 DIAGNOSIS — M16.0 PRIMARY OSTEOARTHRITIS OF BOTH HIPS: Primary | ICD-10-CM

## 2024-05-21 DIAGNOSIS — Z87.39 HISTORY OF OSTEOARTHRITIS: ICD-10-CM

## 2024-05-21 DIAGNOSIS — M19.09 PRIMARY OSTEOARTHRITIS OF OTHER SITE: ICD-10-CM

## 2024-05-22 ENCOUNTER — TELEPHONE (OUTPATIENT)
Dept: INTERVENTIONAL RADIOLOGY/VASCULAR | Age: 69
End: 2024-05-22

## 2024-05-22 DIAGNOSIS — M16.0 PRIMARY OSTEOARTHRITIS OF BOTH HIPS: ICD-10-CM

## 2024-05-22 DIAGNOSIS — M19.09 PRIMARY OSTEOARTHRITIS OF OTHER SITE: ICD-10-CM

## 2024-05-22 DIAGNOSIS — Z87.39 HISTORY OF OSTEOARTHRITIS: ICD-10-CM

## 2024-05-22 LAB
ERYTHROCYTE [DISTWIDTH] IN BLOOD BY AUTOMATED COUNT: 21.1 % (ref 11.5–14.5)
HCT VFR BLD AUTO: 31.7 % (ref 37–47)
HGB BLD-MCNC: 10 G/DL (ref 12–16)
INR PPP: 1.1
MCH RBC QN AUTO: 26.9 PG (ref 27–31.3)
MCHC RBC AUTO-ENTMCNC: 31.5 % (ref 33–37)
MCV RBC AUTO: 85.2 FL (ref 79.4–94.8)
PLATELET # BLD AUTO: 361 K/UL (ref 130–400)
PROTHROMBIN TIME: 14 SEC (ref 12.3–14.9)
RBC # BLD AUTO: 3.72 M/UL (ref 4.2–5.4)
WBC # BLD AUTO: 6.7 K/UL (ref 4.8–10.8)

## 2024-05-22 NOTE — TELEPHONE ENCOUNTER
Patient was given this information via phone conversation - voiced understanding  2.  Spoke to Karlene in Specials to schedule procedure    >  YOUR PROCEDURE IS SCHEDULED ON: 5/23/2024 @ 2:00 pm (Left hip) - MRI @ 3:00 pm   >  You will need to arrive at 1:30 pm from home and check in at the Diagnostic Imaging Check In desk.   >  Patient to have PT/INR and CBC drawn anytime between now and 1 day prior to procedure      >  YOUR PROCEDURE IS SCHEDULED ON: 6/3//2024 @ 3:00 pm (Right hip) - MRI @ 4:00 pm   >  You will need to arrive at 2:30 pm from home and check in at the Diagnostic Imaging Check In desk.

## 2024-05-23 ENCOUNTER — HOSPITAL ENCOUNTER (OUTPATIENT)
Dept: INTERVENTIONAL RADIOLOGY/VASCULAR | Age: 69
Discharge: HOME | End: 2024-05-25
Payer: MEDICARE

## 2024-05-23 VITALS
HEART RATE: 77 BPM | SYSTOLIC BLOOD PRESSURE: 153 MMHG | OXYGEN SATURATION: 99 % | DIASTOLIC BLOOD PRESSURE: 90 MMHG | RESPIRATION RATE: 15 BRPM

## 2024-05-23 DIAGNOSIS — M16.0 PRIMARY OSTEOARTHRITIS OF BOTH HIPS: ICD-10-CM

## 2024-05-23 PROCEDURE — 20610 DRAIN/INJ JOINT/BURSA W/O US: CPT

## 2024-05-23 PROCEDURE — 6360000002 HC RX W HCPCS: Performed by: RADIOLOGY

## 2024-05-23 PROCEDURE — 6360000004 HC RX CONTRAST MEDICATION: Performed by: RADIOLOGY

## 2024-05-23 PROCEDURE — 2500000003 HC RX 250 WO HCPCS: Performed by: RADIOLOGY

## 2024-05-23 PROCEDURE — 77002 NEEDLE LOCALIZATION BY XRAY: CPT

## 2024-05-23 RX ORDER — IOPAMIDOL 408 MG/ML
INJECTION, SOLUTION INTRATHECAL PRN
Status: COMPLETED | OUTPATIENT
Start: 2024-05-23 | End: 2024-05-23

## 2024-05-23 RX ORDER — LIDOCAINE HYDROCHLORIDE 20 MG/ML
INJECTION, SOLUTION INFILTRATION; PERINEURAL PRN
Status: COMPLETED | OUTPATIENT
Start: 2024-05-23 | End: 2024-05-23

## 2024-05-23 RX ORDER — BUPIVACAINE HYDROCHLORIDE 5 MG/ML
INJECTION, SOLUTION EPIDURAL; INTRACAUDAL PRN
Status: COMPLETED | OUTPATIENT
Start: 2024-05-23 | End: 2024-05-23

## 2024-05-23 RX ORDER — TRIAMCINOLONE ACETONIDE 40 MG/ML
INJECTION, SUSPENSION INTRA-ARTICULAR; INTRAMUSCULAR PRN
Status: COMPLETED | OUTPATIENT
Start: 2024-05-23 | End: 2024-05-23

## 2024-05-23 RX ADMIN — IOPAMIDOL 4 ML: 408 INJECTION, SOLUTION INTRATHECAL at 14:24

## 2024-05-23 RX ADMIN — BUPIVACAINE HYDROCHLORIDE 3 ML: 5 INJECTION, SOLUTION EPIDURAL; INTRACAUDAL; PERINEURAL at 14:24

## 2024-05-23 RX ADMIN — LIDOCAINE HYDROCHLORIDE 7 ML: 20 INJECTION, SOLUTION INFILTRATION; PERINEURAL at 14:23

## 2024-05-23 RX ADMIN — TRIAMCINOLONE ACETONIDE 80 MG: 40 INJECTION, SUSPENSION INTRA-ARTICULAR; INTRAMUSCULAR at 14:24

## 2024-05-23 NOTE — OR NURSING
NO SEDATION -RIGHT HIP STEROID INJECTION      Pt brought to specials via w/c. Pt A&Ox4, respirations even and unlabored, skin WNL. Pt states right hip pain worse than left hip pain. Pt agree for right hip to be done today and left hip to be done 6/3/24. Consent obtained for right hip steroid injection. Minimally assisted pt onto table in supine position. States pain 10/10.  History, allergies and medications verified. Vital signs obtained, VSS.     Dr Ramsey marked right hip site using fluoro imaging.     Site cleansed with chloraprep by [employer]. After 3 minute dry time, prepped and draped in a sterile fashion by Dr. Ramsey.    Time out performed.     2% lidocaine used to numb procedure site, see eMar.     Spinal needle 20G x 3.5\" inserted and contrast given to confirm placement using fluoro guidance, see eMar.     Medication cocktail instilled through spinal needle under fluoro guidance, see eMar.     Pt tolerating procedure well and verbal support given throughout.     Needle removed, band aid applied, no bleeding noted.     Pt assisted off table. States pain has improved. Discharge instructions provided and pt verbalized understanding.    Accompanied pt to changing room for d/c home.Electronically signed by Leesa Brenner RN on 5/23/2024 at 2:29 PM

## 2024-05-31 ENCOUNTER — PRE-PROCEDURE TELEPHONE (OUTPATIENT)
Dept: INTERVENTIONAL RADIOLOGY/VASCULAR | Age: 69
End: 2024-05-31

## 2024-06-03 ENCOUNTER — HOSPITAL ENCOUNTER (OUTPATIENT)
Dept: INTERVENTIONAL RADIOLOGY/VASCULAR | Age: 69
Discharge: HOME | End: 2024-06-05
Payer: MEDICARE

## 2024-06-03 VITALS — SYSTOLIC BLOOD PRESSURE: 158 MMHG | HEART RATE: 96 BPM | DIASTOLIC BLOOD PRESSURE: 87 MMHG | OXYGEN SATURATION: 95 %

## 2024-06-03 PROCEDURE — 6360000004 HC RX CONTRAST MEDICATION: Performed by: RADIOLOGY

## 2024-06-03 PROCEDURE — 20610 DRAIN/INJ JOINT/BURSA W/O US: CPT

## 2024-06-03 PROCEDURE — 2500000003 HC RX 250 WO HCPCS: Performed by: RADIOLOGY

## 2024-06-03 PROCEDURE — 77002 NEEDLE LOCALIZATION BY XRAY: CPT

## 2024-06-03 PROCEDURE — 6360000002 HC RX W HCPCS: Performed by: RADIOLOGY

## 2024-06-03 PROCEDURE — 20610 DRAIN/INJ JOINT/BURSA W/O US: CPT | Performed by: RADIOLOGY

## 2024-06-03 RX ORDER — TRIAMCINOLONE ACETONIDE 40 MG/ML
INJECTION, SUSPENSION INTRA-ARTICULAR; INTRAMUSCULAR PRN
Status: COMPLETED | OUTPATIENT
Start: 2024-06-03 | End: 2024-06-03

## 2024-06-03 RX ORDER — BUPIVACAINE HYDROCHLORIDE 5 MG/ML
INJECTION, SOLUTION PERINEURAL PRN
Status: COMPLETED | OUTPATIENT
Start: 2024-06-03 | End: 2024-06-03

## 2024-06-03 RX ORDER — LIDOCAINE HYDROCHLORIDE 20 MG/ML
INJECTION, SOLUTION INFILTRATION; PERINEURAL PRN
Status: COMPLETED | OUTPATIENT
Start: 2024-06-03 | End: 2024-06-03

## 2024-06-03 RX ORDER — IOPAMIDOL 408 MG/ML
INJECTION, SOLUTION INTRATHECAL PRN
Status: COMPLETED | OUTPATIENT
Start: 2024-06-03 | End: 2024-06-03

## 2024-06-03 RX ADMIN — BUPIVACAINE HYDROCHLORIDE 3 ML: 5 INJECTION, SOLUTION PERINEURAL at 15:10

## 2024-06-03 RX ADMIN — IOPAMIDOL 3 ML: 408 INJECTION, SOLUTION INTRATHECAL at 15:09

## 2024-06-03 RX ADMIN — TRIAMCINOLONE ACETONIDE 80 MG: 40 INJECTION, SUSPENSION INTRA-ARTICULAR; INTRAMUSCULAR at 15:10

## 2024-06-03 RX ADMIN — LIDOCAINE HYDROCHLORIDE 8 ML: 20 INJECTION, SOLUTION INFILTRATION; PERINEURAL at 15:09

## 2024-06-03 NOTE — OR NURSING
NO SEDATION    1459 Pt brought to specials. Procedure explained and consent signed. Minimally assisted pt onto table in supine position. States pain 10/10 pain to L hip.   History, allergies and medications verified. Vital signs obtained.    1502 Dr Ramsey marked left hip site using fluoro imaging.    1507 Time out performed. Site cleansed with chloraprep by [employer] tech. After 3 minute dry time, prepped and draped in a sterile fashion by Dr. Ramsey.    1509 2% lidocaine used to numb procedure site, see eMar.  Spinal needle 20G x 3.5\" inserted and contrast given to confirm placement using fluoro guidance, see eMar.     1510 Medication cocktail instilled through spinal needle under fluoro guidance, see eMar. Pt tolerating procedure well and verbal support given throughout. Needle removed, band aid applied, no bleeding noted.     1512 Pt assisted off table. States pain has improved. Discharge instructions provided and pt verbalized understanding.    1518 Pt taken via WC to discharge exit. Pt states she has 0/10 pain to L hip at this time. Pt family member to drive her home.

## 2024-07-24 DIAGNOSIS — I10 HYPERTENSION, UNSPECIFIED TYPE: ICD-10-CM

## 2024-07-24 RX ORDER — AMLODIPINE BESYLATE 10 MG/1
TABLET ORAL
Qty: 90 TABLET | Refills: 3 | Status: SHIPPED | OUTPATIENT
Start: 2024-07-24

## 2024-10-16 ENCOUNTER — APPOINTMENT (OUTPATIENT)
Dept: PRIMARY CARE | Facility: CLINIC | Age: 69
End: 2024-10-16
Payer: MEDICARE

## 2024-10-16 DIAGNOSIS — Z79.899 DRUG THERAPY: ICD-10-CM

## 2024-10-16 DIAGNOSIS — Z11.59 NEED FOR HEPATITIS C SCREENING TEST: ICD-10-CM

## 2024-10-16 DIAGNOSIS — Z13.9 SCREENING FOR CONDITION: ICD-10-CM

## 2024-10-16 DIAGNOSIS — E55.9 VITAMIN D DEFICIENCY: ICD-10-CM

## 2024-10-16 DIAGNOSIS — E78.5 HYPERLIPIDEMIA, UNSPECIFIED HYPERLIPIDEMIA TYPE: ICD-10-CM

## 2024-10-16 DIAGNOSIS — Z00.00 PREVENTATIVE HEALTH CARE: ICD-10-CM

## 2024-10-16 LAB
25(OH)D3 SERPL-MCNC: 72 NG/ML (ref 30–100)
ALBUMIN SERPL BCP-MCNC: 4.5 G/DL (ref 3.4–5)
ALP SERPL-CCNC: 87 U/L (ref 33–136)
ALT SERPL W P-5'-P-CCNC: 20 U/L (ref 7–45)
ANION GAP SERPL CALC-SCNC: 11 MMOL/L (ref 10–20)
APPEARANCE UR: CLEAR
AST SERPL W P-5'-P-CCNC: 19 U/L (ref 9–39)
BASOPHILS # BLD AUTO: 0.02 X10*3/UL (ref 0–0.1)
BASOPHILS NFR BLD AUTO: 0.4 %
BILIRUB SERPL-MCNC: 0.4 MG/DL (ref 0–1.2)
BILIRUB UR STRIP.AUTO-MCNC: NEGATIVE MG/DL
BUN SERPL-MCNC: 19 MG/DL (ref 6–23)
CALCIUM SERPL-MCNC: 9.5 MG/DL (ref 8.6–10.3)
CHLORIDE SERPL-SCNC: 102 MMOL/L (ref 98–107)
CHOLEST SERPL-MCNC: 180 MG/DL (ref 0–199)
CHOLESTEROL/HDL RATIO: 2.1
CO2 SERPL-SCNC: 30 MMOL/L (ref 21–32)
COLOR UR: COLORLESS
CREAT SERPL-MCNC: 0.74 MG/DL (ref 0.5–1.05)
EGFRCR SERPLBLD CKD-EPI 2021: 88 ML/MIN/1.73M*2
EOSINOPHIL # BLD AUTO: 0.1 X10*3/UL (ref 0–0.7)
EOSINOPHIL NFR BLD AUTO: 2.2 %
ERYTHROCYTE [DISTWIDTH] IN BLOOD BY AUTOMATED COUNT: 15.3 % (ref 11.5–14.5)
EST. AVERAGE GLUCOSE BLD GHB EST-MCNC: 108 MG/DL
GLUCOSE SERPL-MCNC: 72 MG/DL (ref 74–99)
GLUCOSE UR STRIP.AUTO-MCNC: NORMAL MG/DL
HBA1C MFR BLD: 5.4 %
HCT VFR BLD AUTO: 34.9 % (ref 36–46)
HCV AB SER QL: NONREACTIVE
HDLC SERPL-MCNC: 85.1 MG/DL
HGB BLD-MCNC: 11.9 G/DL (ref 12–16)
IMM GRANULOCYTES # BLD AUTO: 0.01 X10*3/UL (ref 0–0.7)
IMM GRANULOCYTES NFR BLD AUTO: 0.2 % (ref 0–0.9)
KETONES UR STRIP.AUTO-MCNC: NEGATIVE MG/DL
LDLC SERPL CALC-MCNC: 82 MG/DL
LEUKOCYTE ESTERASE UR QL STRIP.AUTO: NEGATIVE
LYMPHOCYTES # BLD AUTO: 1.45 X10*3/UL (ref 1.2–4.8)
LYMPHOCYTES NFR BLD AUTO: 32.4 %
MAGNESIUM SERPL-MCNC: 2.15 MG/DL (ref 1.6–2.4)
MCH RBC QN AUTO: 32.9 PG (ref 26–34)
MCHC RBC AUTO-ENTMCNC: 34.1 G/DL (ref 32–36)
MCV RBC AUTO: 96 FL (ref 80–100)
MONOCYTES # BLD AUTO: 0.38 X10*3/UL (ref 0.1–1)
MONOCYTES NFR BLD AUTO: 8.5 %
NEUTROPHILS # BLD AUTO: 2.51 X10*3/UL (ref 1.2–7.7)
NEUTROPHILS NFR BLD AUTO: 56.3 %
NITRITE UR QL STRIP.AUTO: NEGATIVE
NON HDL CHOLESTEROL: 95 MG/DL (ref 0–149)
NRBC BLD-RTO: 0 /100 WBCS (ref 0–0)
PH UR STRIP.AUTO: 7.5 [PH]
PLATELET # BLD AUTO: 239 X10*3/UL (ref 150–450)
POTASSIUM SERPL-SCNC: 3.9 MMOL/L (ref 3.5–5.3)
PROT SERPL-MCNC: 7.2 G/DL (ref 6.4–8.2)
PROT UR STRIP.AUTO-MCNC: NEGATIVE MG/DL
RBC # BLD AUTO: 3.62 X10*6/UL (ref 4–5.2)
RBC # UR STRIP.AUTO: NEGATIVE /UL
SODIUM SERPL-SCNC: 139 MMOL/L (ref 136–145)
SP GR UR STRIP.AUTO: 1.01
TRIGL SERPL-MCNC: 67 MG/DL (ref 0–149)
TSH SERPL-ACNC: 2.22 MIU/L (ref 0.44–3.98)
UROBILINOGEN UR STRIP.AUTO-MCNC: NORMAL MG/DL
VLDL: 13 MG/DL (ref 0–40)
WBC # BLD AUTO: 4.5 X10*3/UL (ref 4.4–11.3)

## 2024-10-16 PROCEDURE — 83036 HEMOGLOBIN GLYCOSYLATED A1C: CPT

## 2024-10-16 PROCEDURE — 80061 LIPID PANEL: CPT

## 2024-10-16 PROCEDURE — 85025 COMPLETE CBC W/AUTO DIFF WBC: CPT

## 2024-10-16 PROCEDURE — 81003 URINALYSIS AUTO W/O SCOPE: CPT

## 2024-10-16 PROCEDURE — 80053 COMPREHEN METABOLIC PANEL: CPT

## 2024-10-16 PROCEDURE — 83735 ASSAY OF MAGNESIUM: CPT

## 2024-10-16 PROCEDURE — G0472 HEP C SCREEN HIGH RISK/OTHER: HCPCS

## 2024-10-16 PROCEDURE — 82306 VITAMIN D 25 HYDROXY: CPT

## 2024-10-16 PROCEDURE — 84443 ASSAY THYROID STIM HORMONE: CPT

## 2024-10-16 NOTE — PROGRESS NOTES
Subjective   Patient ID: Sindy Bailey is a 69 y.o. female who presents for Labs Only (Pt in today for lab draw).    HPI     Review of Systems    Objective   There were no vitals taken for this visit.    Physical Exam    Assessment/Plan

## 2024-10-23 ENCOUNTER — APPOINTMENT (OUTPATIENT)
Dept: PRIMARY CARE | Facility: CLINIC | Age: 69
End: 2024-10-23
Payer: MEDICARE

## 2024-10-23 VITALS
BODY MASS INDEX: 25.95 KG/M2 | HEART RATE: 97 BPM | DIASTOLIC BLOOD PRESSURE: 76 MMHG | HEIGHT: 64 IN | OXYGEN SATURATION: 96 % | WEIGHT: 152 LBS | SYSTOLIC BLOOD PRESSURE: 149 MMHG

## 2024-10-23 DIAGNOSIS — E55.9 VITAMIN D DEFICIENCY: ICD-10-CM

## 2024-10-23 DIAGNOSIS — M25.552 HIP PAIN, BILATERAL: ICD-10-CM

## 2024-10-23 DIAGNOSIS — E86.0 DEHYDRATION: ICD-10-CM

## 2024-10-23 DIAGNOSIS — Z13.9 SCREENING FOR CONDITION: ICD-10-CM

## 2024-10-23 DIAGNOSIS — E78.5 HYPERLIPIDEMIA, UNSPECIFIED HYPERLIPIDEMIA TYPE: ICD-10-CM

## 2024-10-23 DIAGNOSIS — D64.9 ANEMIA, UNSPECIFIED TYPE: Primary | ICD-10-CM

## 2024-10-23 DIAGNOSIS — I10 HYPERTENSION, UNSPECIFIED TYPE: ICD-10-CM

## 2024-10-23 DIAGNOSIS — Z79.899 DRUG THERAPY: ICD-10-CM

## 2024-10-23 DIAGNOSIS — Z11.59 NEED FOR HEPATITIS C SCREENING TEST: ICD-10-CM

## 2024-10-23 DIAGNOSIS — M25.551 HIP PAIN, BILATERAL: ICD-10-CM

## 2024-10-23 PROCEDURE — 3077F SYST BP >= 140 MM HG: CPT | Performed by: FAMILY MEDICINE

## 2024-10-23 PROCEDURE — 3008F BODY MASS INDEX DOCD: CPT | Performed by: FAMILY MEDICINE

## 2024-10-23 PROCEDURE — G2211 COMPLEX E/M VISIT ADD ON: HCPCS | Performed by: FAMILY MEDICINE

## 2024-10-23 PROCEDURE — 1123F ACP DISCUSS/DSCN MKR DOCD: CPT | Performed by: FAMILY MEDICINE

## 2024-10-23 PROCEDURE — 1158F ADVNC CARE PLAN TLK DOCD: CPT | Performed by: FAMILY MEDICINE

## 2024-10-23 PROCEDURE — 1159F MED LIST DOCD IN RCRD: CPT | Performed by: FAMILY MEDICINE

## 2024-10-23 PROCEDURE — 1036F TOBACCO NON-USER: CPT | Performed by: FAMILY MEDICINE

## 2024-10-23 PROCEDURE — 3078F DIAST BP <80 MM HG: CPT | Performed by: FAMILY MEDICINE

## 2024-10-23 PROCEDURE — 99214 OFFICE O/P EST MOD 30 MIN: CPT | Performed by: FAMILY MEDICINE

## 2024-10-23 NOTE — PROGRESS NOTES
Subjective   Patient ID: Sindy Bailey is a 69 y.o. female who presents for 6 Month FU (Pt in today for routine 6 month FU).    Review of Systems  Denies N/V/D/C, no HA/S/V, denies rashes/lesions, no CP/SOB. Denies fevers/chills.  Positive for bilateral leg pain and bilateral hip pain.  All other systems were negative.    Objective   Physical Exam  Gen: NAD  eyes: EOMI, PERRLA  ENT: hearing grossly intact, no nasal discharge  resp: CTABL, without R/R  heart: RRR without MRG  GI: abd: S/ND/NT, BS+  lymph: no axillary, cervical, supraclavicular lymphadenopathy noted   MS: gait grossly WNL,  derm: no rashes or lesions noted  neuro: CN II-XII grossly intact  psych: A&Ox3      Assessment/Plan   Diagnoses and all orders for this visit:  Anemia, unspecified type  -     CBC; Future  -     Iron and TIBC; Future  -     Ferritin; Future  -     Vitamin B12; Future  -     Folate; Future  Need for hepatitis C screening test  Vitamin D deficiency  Drug therapy  Screening for condition  Hyperlipidemia, unspecified hyperlipidemia type  Hip pain, bilateral  Hypertension, unspecified type  Dehydration        2025 will be next annual preventative visit, Medicare wellness visit.    Today's annual lab review.      -----    Screening for breast cancer; -->> ordered mammogram.    Screening for colon cancer;  Patient denies any family history of colon cancer. -->>  Order Cologuard.    Screening for hepatitis C nonreactive.    Immunization counseling: Due for annual flu shot as well as the latest coronavirus booster.  Also due for the new RSV shot, shingles series, pneumonia shot, and tetanus. -->> Check with your pharmacy to keep up to date on immunizations.    -----    Overweight: BMI 26.  Down yet another 16  pounds since last appointment here 6 months ago.  Does note she is improved diet, eating more vegetables/salads, less red meat.  Also is walking a lot. -->> Keep up the excellent work.        Annual labs reviewed:    - Dehydrated  yet again on labs.  -->>  Drink more of water.  A good goal would be 3 quarts a day.    - Hyperlipidemia, cholesterol numbers are excellent on atorvastatin 20 mg daily plus co-Q10.    - Drug therapy, screening for conditions.  A1c is 5.4, so no diabetes.  TSH is within normal limits and no thyroid problems noted.    - Vitamin D deficiency.  72, was 57, 65.  Goal is .  Patient on D3 5000 units daily, so a total of 35,000 units weekly. -->>  Increase to 50,000 units weekly, so could take 5000 every day but Monday Wednesday Friday take 10,000.  Will recheck in about 6-12 months.      -Mild anemia, new diagnosis.  Hemoglobin is 11.9. -->>  Will check an anemia panel next time we do blood work.          Hypertension: blood pressure borderline today. In general she says her blood pressures are good. -->> Will refill amlodipine as needed.  continue checking blood pressures at rest at home, and write down numbers and bring them to the appointment.        Arthritis, hip pain, bilateral leg pain.  Doing well enough on current therapy. Taking diclofenac as well as Tylenol at night when laying down using topical Voltaren daily.  Celebrex was ineffective.. Pt has Rolator which helps her accomplish activities of daily living.  -->>  Will refill as needed.  If it ever gets very bad remember to follow-up with orthopedics and they were discussing injections.      - Return in about 6 months for in office ECG, [facility], annual preventative visit and lab review.   - Patient will come in about a week before the appointment to get an anemia panel drawn.

## 2024-10-23 NOTE — PATIENT INSTRUCTIONS
2025 will be next annual preventative visit, Medicare wellness visit.    Today's annual lab review.      -----    Screening for breast cancer; -->> ordered mammogram.    Screening for colon cancer;  Patient denies any family history of colon cancer. -->>  Order Cologuard.    Screening for hepatitis C nonreactive.    Immunization counseling: Due for annual flu shot as well as the latest coronavirus booster.  Also due for the new RSV shot, shingles series, pneumonia shot, and tetanus. -->> Check with your pharmacy to keep up to date on immunizations.    -----    Overweight: BMI 26.  Down yet another 16  pounds since last appointment here 6 months ago.  Does note she is improved diet, eating more vegetables/salads, less red meat.  Also is walking a lot. -->> Keep up the excellent work.        Annual labs reviewed:    - Dehydrated yet again on labs.  -->>  Drink more of water.  A good goal would be 3 quarts a day.    - Hyperlipidemia, cholesterol numbers are excellent on atorvastatin 20 mg daily plus co-Q10.    - Drug therapy, screening for conditions.  A1c is 5.4, so no diabetes.  TSH is within normal limits and no thyroid problems noted.    - Vitamin D deficiency.  72, was 57, 65.  Goal is .  Patient on D3 5000 units daily, so a total of 35,000 units weekly. -->>  Increase to 50,000 units weekly, so could take 5000 every day but Monday Wednesday Friday take 10,000.  Will recheck in about 6-12 months.      -Mild anemia, new diagnosis.  Hemoglobin is 11.9. -->>  Will check an anemia panel next time we do blood work.          Hypertension: blood pressure borderline today. In general she says her blood pressures are good. -->> Will refill amlodipine as needed.  continue checking blood pressures at rest at home, and write down numbers and bring them to the appointment.        Arthritis, hip pain, bilateral leg pain.  Doing well enough on current therapy. Taking diclofenac as well as Tylenol at night when laying down  using topical Voltaren daily.  Celebrex was ineffective.. Pt has Rolator which helps her accomplish activities of daily living.  -->>  Will refill as needed.  If it ever gets very bad remember to follow-up with orthopedics and they were discussing injections.      - Return in about 6 months for in office ECG, [facility], annual preventative visit and lab review.   - Patient will come in about a week before the appointment to get an anemia panel drawn.

## 2025-04-16 ENCOUNTER — APPOINTMENT (OUTPATIENT)
Dept: PRIMARY CARE | Facility: CLINIC | Age: 70
End: 2025-04-16
Payer: MEDICARE

## 2025-04-16 DIAGNOSIS — D64.9 ANEMIA, UNSPECIFIED TYPE: ICD-10-CM

## 2025-04-18 LAB
ERYTHROCYTE [DISTWIDTH] IN BLOOD BY AUTOMATED COUNT: 12.8 % (ref 11–15)
FERRITIN SERPL-MCNC: 25 NG/ML (ref 16–288)
FOLATE SERPL-MCNC: >24 NG/ML
HCT VFR BLD AUTO: 39.6 % (ref 35–45)
HGB BLD-MCNC: 13 G/DL (ref 11.7–15.5)
IRON SATN MFR SERPL: 14 % (CALC) (ref 16–45)
IRON SERPL-MCNC: 50 MCG/DL (ref 45–160)
MCH RBC QN AUTO: 33.7 PG (ref 27–33)
MCHC RBC AUTO-ENTMCNC: 32.8 G/DL (ref 32–36)
MCV RBC AUTO: 102.6 FL (ref 80–100)
PLATELET # BLD AUTO: 255 THOUSAND/UL (ref 140–400)
PMV BLD REES-ECKER: 10.9 FL (ref 7.5–12.5)
RBC # BLD AUTO: 3.86 MILLION/UL (ref 3.8–5.1)
TIBC SERPL-MCNC: 361 MCG/DL (CALC) (ref 250–450)
VIT B12 SERPL-MCNC: 1815 PG/ML (ref 200–1100)
WBC # BLD AUTO: 4.4 THOUSAND/UL (ref 3.8–10.8)

## 2025-04-23 ENCOUNTER — APPOINTMENT (OUTPATIENT)
Dept: PRIMARY CARE | Facility: CLINIC | Age: 70
End: 2025-04-23
Payer: MEDICARE

## 2025-04-23 VITALS
BODY MASS INDEX: 27.66 KG/M2 | DIASTOLIC BLOOD PRESSURE: 80 MMHG | SYSTOLIC BLOOD PRESSURE: 127 MMHG | HEART RATE: 88 BPM | OXYGEN SATURATION: 96 % | HEIGHT: 64 IN | WEIGHT: 162 LBS

## 2025-04-23 DIAGNOSIS — I10 HYPERTENSION, UNSPECIFIED TYPE: ICD-10-CM

## 2025-04-23 DIAGNOSIS — Z00.00 ROUTINE GENERAL MEDICAL EXAMINATION AT HEALTH CARE FACILITY: Primary | ICD-10-CM

## 2025-04-23 DIAGNOSIS — M25.552 PAIN OF BOTH HIP JOINTS: ICD-10-CM

## 2025-04-23 DIAGNOSIS — M25.552 HIP PAIN, BILATERAL: ICD-10-CM

## 2025-04-23 DIAGNOSIS — Z12.11 ENCOUNTER FOR SCREENING FOR MALIGNANT NEOPLASM OF COLON: ICD-10-CM

## 2025-04-23 DIAGNOSIS — M25.551 PAIN OF BOTH HIP JOINTS: ICD-10-CM

## 2025-04-23 DIAGNOSIS — E61.1 IRON DEFICIENCY: ICD-10-CM

## 2025-04-23 DIAGNOSIS — Z00.00 PREVENTATIVE HEALTH CARE: ICD-10-CM

## 2025-04-23 DIAGNOSIS — E55.9 VITAMIN D DEFICIENCY: ICD-10-CM

## 2025-04-23 DIAGNOSIS — Z79.899 DRUG THERAPY: ICD-10-CM

## 2025-04-23 DIAGNOSIS — Z13.9 SCREENING FOR CONDITION: ICD-10-CM

## 2025-04-23 DIAGNOSIS — M16.0 OSTEOARTHRITIS OF BOTH HIPS, UNSPECIFIED OSTEOARTHRITIS TYPE: ICD-10-CM

## 2025-04-23 DIAGNOSIS — M25.551 HIP PAIN, BILATERAL: ICD-10-CM

## 2025-04-23 DIAGNOSIS — D64.9 ANEMIA, UNSPECIFIED TYPE: ICD-10-CM

## 2025-04-23 DIAGNOSIS — Z12.31 ENCOUNTER FOR SCREENING MAMMOGRAM FOR MALIGNANT NEOPLASM OF BREAST: ICD-10-CM

## 2025-04-23 DIAGNOSIS — E78.5 HYPERLIPIDEMIA, UNSPECIFIED HYPERLIPIDEMIA TYPE: ICD-10-CM

## 2025-04-23 PROCEDURE — 1170F FXNL STATUS ASSESSED: CPT | Performed by: FAMILY MEDICINE

## 2025-04-23 PROCEDURE — 99215 OFFICE O/P EST HI 40 MIN: CPT | Performed by: FAMILY MEDICINE

## 2025-04-23 PROCEDURE — 3079F DIAST BP 80-89 MM HG: CPT | Performed by: FAMILY MEDICINE

## 2025-04-23 PROCEDURE — 1160F RVW MEDS BY RX/DR IN RCRD: CPT | Performed by: FAMILY MEDICINE

## 2025-04-23 PROCEDURE — 1159F MED LIST DOCD IN RCRD: CPT | Performed by: FAMILY MEDICINE

## 2025-04-23 PROCEDURE — 99397 PER PM REEVAL EST PAT 65+ YR: CPT | Performed by: FAMILY MEDICINE

## 2025-04-23 PROCEDURE — 3008F BODY MASS INDEX DOCD: CPT | Performed by: FAMILY MEDICINE

## 2025-04-23 PROCEDURE — G0439 PPPS, SUBSEQ VISIT: HCPCS | Performed by: FAMILY MEDICINE

## 2025-04-23 PROCEDURE — 1123F ACP DISCUSS/DSCN MKR DOCD: CPT | Performed by: FAMILY MEDICINE

## 2025-04-23 PROCEDURE — 3074F SYST BP LT 130 MM HG: CPT | Performed by: FAMILY MEDICINE

## 2025-04-23 PROCEDURE — 93000 ELECTROCARDIOGRAM COMPLETE: CPT | Performed by: FAMILY MEDICINE

## 2025-04-23 PROCEDURE — 1036F TOBACCO NON-USER: CPT | Performed by: FAMILY MEDICINE

## 2025-04-23 RX ORDER — DICLOFENAC SODIUM 75 MG/1
TABLET, DELAYED RELEASE ORAL
Qty: 180 TABLET | Refills: 1 | Status: SHIPPED | OUTPATIENT
Start: 2025-04-23

## 2025-04-23 RX ORDER — ATORVASTATIN CALCIUM 20 MG/1
20 TABLET, FILM COATED ORAL DAILY
Qty: 100 TABLET | Refills: 3 | Status: SHIPPED | OUTPATIENT
Start: 2025-04-23

## 2025-04-23 RX ORDER — DULOXETIN HYDROCHLORIDE 30 MG/1
CAPSULE, DELAYED RELEASE ORAL
Qty: 200 CAPSULE | Refills: 3 | Status: SHIPPED | OUTPATIENT
Start: 2025-04-23

## 2025-04-23 ASSESSMENT — PATIENT HEALTH QUESTIONNAIRE - PHQ9
2. FEELING DOWN, DEPRESSED OR HOPELESS: NOT AT ALL
1. LITTLE INTEREST OR PLEASURE IN DOING THINGS: NOT AT ALL
SUM OF ALL RESPONSES TO PHQ9 QUESTIONS 1 AND 2: 0
1. LITTLE INTEREST OR PLEASURE IN DOING THINGS: NOT AT ALL
SUM OF ALL RESPONSES TO PHQ9 QUESTIONS 1 AND 2: 0
2. FEELING DOWN, DEPRESSED OR HOPELESS: NOT AT ALL

## 2025-04-23 ASSESSMENT — ENCOUNTER SYMPTOMS
DEPRESSION: 0
LOSS OF SENSATION IN FEET: 0
OCCASIONAL FEELINGS OF UNSTEADINESS: 0

## 2025-04-23 ASSESSMENT — ACTIVITIES OF DAILY LIVING (ADL)
GROCERY_SHOPPING: INDEPENDENT
TAKING_MEDICATION: INDEPENDENT
DRESSING: INDEPENDENT
DOING_HOUSEWORK: INDEPENDENT
BATHING: INDEPENDENT
MANAGING_FINANCES: INDEPENDENT

## 2025-04-23 NOTE — PATIENT INSTRUCTIONS
annual preventative visit, Medicare wellness visit, lab review.      -----    Screening for breast cancer -->> ordered mammogram.    Screening for colon cancer;  Patient denies any family history of colon cancer. -->>  Order Cologuard.    Screening for hepatitis C nonreactive.    Immunization counseling: Due for annual flu shot as well as the latest coronavirus booster.  Also due for the new RSV shot, shingles series, pneumonia shot, and tetanus. -->> Check with your pharmacy to keep up to date on immunizations.    -----    Overweight: BMI 26.  Down about 6  pounds over the last year.  Does note she is improved diet, eating more vegetables/salads, less red meat.  Also is walking a lot. -->> Keep up the excellent work.        Do anemia panel reviewed:    -h/o Mild anemia, iron deficiency, anemia resolved at this time, hemoglobin is 13.0, was 11.9.  Send saturation is 14%, we like it 25 to 45%.  B12 and folic acid were excellent. -->>  Recommend adding an iron supplement, daily or every other day or Monday Wednesday Friday.  Could consider Vitron-C, ferrous fumarate, or Geritol liquid, or ask your pharmacist for suggestions.  Will check iron numbers with next annual labs.        regarding previous labs:    - Dehydrated yet again on labs.  -->>  Drink more of water.  A good goal would be 3 quarts a day.    - Hyperlipidemia, cholesterol numbers are excellent on atorvastatin 20 mg daily plus co-Q10.    - Drug therapy, screening for conditions.  A1c is 5.4, so no diabetes.  TSH is within normal limits and no thyroid problems noted.    - Vitamin D deficiency.  72, was 57, 65.  Goal is .  Patient on D3 5000 units daily, so a total of 35,000 units weekly. -->>  Increase to 50,000 units weekly, so could take 5000 every day but Monday Wednesday Friday take 10,000.  Will recheck in about 6-12 months.        Hypertension: blood pressure good today. In general blood pressures taken at home are good. -->> Will refill  amlodipine as needed.  continue checking blood pressures at rest at home, and write down numbers and bring them to the appointment.      Arthritis, hip pain, bilateral leg pain.  Doing not well enough on current therapy. Taking diclofenac as well as Tylenol at night when laying down using topical Voltaren daily.  Celebrex was ineffective. Pt has Rolator which helps her accomplish activities of daily living.  Deviously had some benefit with hip injection but most recent injections did not help. -->>  Will prescribe Cymbalta to take in addition to everything else you are doing.  Recommend trying topical capsaicin.  Asked the pharmacist for other suggestions also.  Will refill as needed.  If it ever gets very bad remember to follow-up with orthopedics to discuss possible hip replacement.      - Return in about 6 months for annual annual lab review.    - Patient will come in about a week before the appointment to get fasting annual labs along with iron numbers drawn.

## 2025-04-23 NOTE — PROGRESS NOTES
Subjective   Patient ID: Sindy Bailey is a 69 y.o. female who presents for [facility] / Preventative / Annual Lab Review (Pt in today for Medicare wellness / Preventative / Annual Lab Review / IO EKg).    Review of Systems  Denies N/V/D/C, no HA/S/V, denies rashes/lesions, no CP/SOB. Denies fevers/chills.  Positive for bilateral leg pain and bilateral hip pain.  All other systems were negative.    Objective   Physical Exam  Gen: NAD  eyes: EOMI, PERRLA  ENT: hearing grossly intact, no nasal discharge  resp: CTABL, without R/R  heart: RRR without MRG  GI: abd: S/ND/NT, BS+  lymph: no axillary, cervical, supraclavicular lymphadenopathy noted   MS: gait grossly WNL,  derm: no rashes or lesions noted  neuro: CN II-XII grossly intact  psych: A&Ox3      Assessment/Plan   Diagnoses and all orders for this visit:  Preventative health care  -     ECG 12 lead (Clinic Performed)        annual preventative visit, Medicare wellness visit, lab review.      -----    Screening for breast cancer -->> ordered mammogram.    Screening for colon cancer;  Patient denies any family history of colon cancer. -->>  Order Cologuard.    Screening for hepatitis C nonreactive.    Immunization counseling: Due for annual flu shot as well as the latest coronavirus booster.  Also due for the new RSV shot, shingles series, pneumonia shot, and tetanus. -->> Check with your pharmacy to keep up to date on immunizations.    -----    Overweight: BMI 26.  Down about 6  pounds over the last year.  Does note she is improved diet, eating more vegetables/salads, less red meat.  Also is walking a lot. -->> Keep up the excellent work.        Do anemia panel reviewed:    -h/o Mild anemia, iron deficiency, anemia resolved at this time, hemoglobin is 13.0, was 11.9.  Send saturation is 14%, we like it 25 to 45%.  B12 and folic acid were excellent. -->>  Recommend adding an iron supplement, daily or every other day or Monday Wednesday Friday.  Could consider Vitron-C,  ferrous fumarate, or Geritol liquid, or ask your pharmacist for suggestions.  Will check iron numbers with next annual labs.        regarding previous labs:    - Dehydrated yet again on labs.  -->>  Drink more of water.  A good goal would be 3 quarts a day.    - Hyperlipidemia, cholesterol numbers are excellent on atorvastatin 20 mg daily plus co-Q10.    - Drug therapy, screening for conditions.  A1c is 5.4, so no diabetes.  TSH is within normal limits and no thyroid problems noted.    - Vitamin D deficiency.  72, was 57, 65.  Goal is .  Patient on D3 5000 units daily, so a total of 35,000 units weekly. -->>  Increase to 50,000 units weekly, so could take 5000 every day but Monday Wednesday Friday take 10,000.  Will recheck in about 6-12 months.        Hypertension: blood pressure good today. In general blood pressures taken at home are good. -->> Will refill amlodipine as needed.  continue checking blood pressures at rest at home, and write down numbers and bring them to the appointment.      Arthritis, hip pain, bilateral leg pain.  Doing not well enough on current therapy. Taking diclofenac as well as Tylenol at night when laying down using topical Voltaren daily.  Celebrex was ineffective. Pt has Rolator which helps her accomplish activities of daily living.  Deviously had some benefit with hip injection but most recent injections did not help. -->>  Will prescribe Cymbalta to take in addition to everything else you are doing.  Recommend trying topical capsaicin.  Asked the pharmacist for other suggestions also.  Will refill as needed.  If it ever gets very bad remember to follow-up with orthopedics to discuss possible hip replacement.      - Return in about 6 months for annual annual lab review.    - Patient will come in about a week before the appointment to get fasting annual labs along with iron numbers drawn.

## 2025-05-09 ENCOUNTER — HOSPITAL ENCOUNTER (OUTPATIENT)
Dept: RADIOLOGY | Facility: HOSPITAL | Age: 70
Discharge: HOME | End: 2025-05-09
Payer: MEDICARE

## 2025-05-09 DIAGNOSIS — Z12.31 ENCOUNTER FOR SCREENING MAMMOGRAM FOR MALIGNANT NEOPLASM OF BREAST: ICD-10-CM

## 2025-05-09 PROCEDURE — 77063 BREAST TOMOSYNTHESIS BI: CPT

## 2025-08-15 DIAGNOSIS — I10 HYPERTENSION, UNSPECIFIED TYPE: ICD-10-CM

## 2025-08-15 RX ORDER — AMLODIPINE BESYLATE 10 MG/1
10 TABLET ORAL
Qty: 90 TABLET | Refills: 3 | Status: SHIPPED | OUTPATIENT
Start: 2025-08-15

## 2025-10-15 ENCOUNTER — APPOINTMENT (OUTPATIENT)
Dept: PRIMARY CARE | Facility: CLINIC | Age: 70
End: 2025-10-15
Payer: MEDICARE

## 2025-10-22 ENCOUNTER — APPOINTMENT (OUTPATIENT)
Dept: PRIMARY CARE | Facility: CLINIC | Age: 70
End: 2025-10-22
Payer: MEDICARE

## (undated) DEVICE — MARKER SURG SKIN GENTIAN VLT REG TIP W/ 6IN RUL

## (undated) DEVICE — SHEET,DRAPE,53X77,STERILE: Brand: MEDLINE

## (undated) DEVICE — HYPODERMIC SAFETY NEEDLE: Brand: MAGELLAN

## (undated) DEVICE — NEEDLE SPINAL 22GA L3.5IN SPINOCAN

## (undated) DEVICE — CONTAINER,SPECIMEN,OR STERILE,4OZ: Brand: MEDLINE

## (undated) DEVICE — APPLICATOR MEDICATED 10.5 CC SOLUTION HI LT ORNG CHLORAPREP

## (undated) DEVICE — DRAPE SURG UTIL 26X15 IN W/ TAPE N INVASIVE MULT LAYR DISP

## (undated) DEVICE — BANDAGE ADH W2XL4IN NITRL FAB STRP CURAD

## (undated) DEVICE — LABEL MED MINI W/ MARKER

## (undated) DEVICE — GLOVE ORANGE PI 8 1/2   MSG9085

## (undated) DEVICE — SYRINGE MED 10ML LUERLOCK TIP W/O SFTY DISP

## (undated) DEVICE — TOWEL,OR,DSP,ST,BLUE,STD,4/PK,20PK/CS: Brand: MEDLINE